# Patient Record
Sex: FEMALE | URBAN - METROPOLITAN AREA
[De-identification: names, ages, dates, MRNs, and addresses within clinical notes are randomized per-mention and may not be internally consistent; named-entity substitution may affect disease eponyms.]

---

## 2022-01-18 ENCOUNTER — INPATIENT (INPATIENT)
Facility: HOSPITAL | Age: 82
LOS: 3 days | Discharge: NURSING, REHAB OR LONG-TERM CARE | DRG: 488 | End: 2022-01-22
Attending: ORTHOPAEDIC SURGERY | Admitting: ORTHOPAEDIC SURGERY
Payer: MEDICARE

## 2022-01-18 VITALS
DIASTOLIC BLOOD PRESSURE: 81 MMHG | HEIGHT: 59 IN | HEART RATE: 110 BPM | OXYGEN SATURATION: 94 % | WEIGHT: 138.01 LBS | RESPIRATION RATE: 18 BRPM | TEMPERATURE: 98 F | SYSTOLIC BLOOD PRESSURE: 142 MMHG

## 2022-01-18 LAB
ANION GAP SERPL CALC-SCNC: 6 MMOL/L — LOW (ref 9–16)
APTT BLD: 45.8 SEC — HIGH (ref 27.5–35.5)
BUN SERPL-MCNC: 30 MG/DL — HIGH (ref 7–23)
CALCIUM SERPL-MCNC: 8.9 MG/DL — SIGNIFICANT CHANGE UP (ref 8.5–10.5)
CHLORIDE SERPL-SCNC: 105 MMOL/L — SIGNIFICANT CHANGE UP (ref 96–108)
CO2 SERPL-SCNC: 29 MMOL/L — SIGNIFICANT CHANGE UP (ref 22–31)
CREAT SERPL-MCNC: 0.66 MG/DL — SIGNIFICANT CHANGE UP (ref 0.5–1.3)
GLUCOSE SERPL-MCNC: 112 MG/DL — HIGH (ref 70–99)
HCT VFR BLD CALC: 38.8 % — SIGNIFICANT CHANGE UP (ref 34.5–45)
HGB BLD-MCNC: 12.8 G/DL — SIGNIFICANT CHANGE UP (ref 11.5–15.5)
INR BLD: 2.5 — HIGH (ref 0.88–1.16)
MCHC RBC-ENTMCNC: 30.7 PG — SIGNIFICANT CHANGE UP (ref 27–34)
MCHC RBC-ENTMCNC: 33 GM/DL — SIGNIFICANT CHANGE UP (ref 32–36)
MCV RBC AUTO: 93 FL — SIGNIFICANT CHANGE UP (ref 80–100)
NRBC # BLD: 0 /100 WBCS — SIGNIFICANT CHANGE UP (ref 0–0)
PLATELET # BLD AUTO: 243 K/UL — SIGNIFICANT CHANGE UP (ref 150–400)
POTASSIUM SERPL-MCNC: 4.2 MMOL/L — SIGNIFICANT CHANGE UP (ref 3.5–5.3)
POTASSIUM SERPL-SCNC: 4.2 MMOL/L — SIGNIFICANT CHANGE UP (ref 3.5–5.3)
PROTHROM AB SERPL-ACNC: 28.7 SEC — HIGH (ref 10.6–13.6)
RBC # BLD: 4.17 M/UL — SIGNIFICANT CHANGE UP (ref 3.8–5.2)
RBC # FLD: 14 % — SIGNIFICANT CHANGE UP (ref 10.3–14.5)
SARS-COV-2 RNA SPEC QL NAA+PROBE: SIGNIFICANT CHANGE UP
SODIUM SERPL-SCNC: 140 MMOL/L — SIGNIFICANT CHANGE UP (ref 132–145)
WBC # BLD: 7.54 K/UL — SIGNIFICANT CHANGE UP (ref 3.8–10.5)
WBC # FLD AUTO: 7.54 K/UL — SIGNIFICANT CHANGE UP (ref 3.8–10.5)

## 2022-01-18 PROCEDURE — 71045 X-RAY EXAM CHEST 1 VIEW: CPT | Mod: 26

## 2022-01-18 PROCEDURE — 73564 X-RAY EXAM KNEE 4 OR MORE: CPT | Mod: 26,RT

## 2022-01-18 PROCEDURE — 99223 1ST HOSP IP/OBS HIGH 75: CPT

## 2022-01-18 PROCEDURE — 99284 EMERGENCY DEPT VISIT MOD MDM: CPT | Mod: FS,25

## 2022-01-18 PROCEDURE — 73130 X-RAY EXAM OF HAND: CPT | Mod: 26,RT

## 2022-01-18 PROCEDURE — 70450 CT HEAD/BRAIN W/O DYE: CPT | Mod: 26,MH

## 2022-01-18 PROCEDURE — 93010 ELECTROCARDIOGRAM REPORT: CPT

## 2022-01-18 RX ORDER — ACETAMINOPHEN 500 MG
650 TABLET ORAL ONCE
Refills: 0 | Status: COMPLETED | OUTPATIENT
Start: 2022-01-18 | End: 2022-01-18

## 2022-01-18 RX ADMIN — Medication 650 MILLIGRAM(S): at 15:28

## 2022-01-18 NOTE — ED BEHAVIORAL HEALTH NOTE - BEHAVIORAL HEALTH NOTE
SW was consulted to the ED by Provider to complete psychosocial assessment for PT admission.  PT gave verbal consent to have assessment.  The assessment was completed by SW.  Team made aware and SW made available for further assistance.

## 2022-01-18 NOTE — ED PROVIDER NOTE - ATTENDING CONTRIBUTION TO CARE
80 yo female with mechanical trip and fall over uneven sidewalk, with (+) patellar fx, requested dr vázquez for orthopedic consults, admit to ortho dr vázquez for ORIF.

## 2022-01-18 NOTE — ED PROVIDER NOTE - CLINICAL SUMMARY MEDICAL DECISION MAKING FREE TEXT BOX
pt presents with right knee pain with patellar irregularity and tenderness on exam. unable to extend the knee. xray shows patellar fracture. pt has existing relationship with Dr. Waite and is requesting his service. will obtain pre-op work up and admit.

## 2022-01-18 NOTE — ED PROVIDER NOTE - OBJECTIVE STATEMENT
82yo F with h/o clotting disorder on coumadin s/p clots to bilat eyes receiving steroid injections, htn, hld presents with right knee pain after mechanical trip and fall. pt states she tripped on uneven sidewalk and landed on her knee. denies hitting her head, ha, dizziness, vision changes, nausea, vomiting, cp, numbness, tingling, weakness, any other concerns.

## 2022-01-18 NOTE — ED ADULT TRIAGE NOTE - CHIEF COMPLAINT QUOTE
Pt BIBA c/o R knee pain s/p mechanical fall. Pt did not hit head, no LOC. Pt has small abrasion on R knee, last tetanus unknown.

## 2022-01-18 NOTE — ED PROVIDER NOTE - MUSCULOSKELETAL, MLM
Spine appears normal, range of motion of spine is not limited, + tenderness and irregularity to the right patella, unable to extend the knee against gravity at all, no tenderness over the patellar tendon or medial/lateral joint line or tibial plateau, no ligamentous laxity

## 2022-01-19 DIAGNOSIS — Z98.890 OTHER SPECIFIED POSTPROCEDURAL STATES: Chronic | ICD-10-CM

## 2022-01-19 DIAGNOSIS — Z98.891 HISTORY OF UTERINE SCAR FROM PREVIOUS SURGERY: Chronic | ICD-10-CM

## 2022-01-19 DIAGNOSIS — M20.41 OTHER HAMMER TOE(S) (ACQUIRED), RIGHT FOOT: Chronic | ICD-10-CM

## 2022-01-19 LAB
ANION GAP SERPL CALC-SCNC: 11 MMOL/L — SIGNIFICANT CHANGE UP (ref 5–17)
APPEARANCE UR: CLEAR — SIGNIFICANT CHANGE UP
APTT BLD: 45.7 SEC — HIGH (ref 27.5–35.5)
BACTERIA # UR AUTO: PRESENT /HPF
BILIRUB UR-MCNC: NEGATIVE — SIGNIFICANT CHANGE UP
BLD GP AB SCN SERPL QL: NEGATIVE — SIGNIFICANT CHANGE UP
BLD GP AB SCN SERPL QL: NEGATIVE — SIGNIFICANT CHANGE UP
BUN SERPL-MCNC: 20 MG/DL — SIGNIFICANT CHANGE UP (ref 7–23)
CALCIUM SERPL-MCNC: 8.9 MG/DL — SIGNIFICANT CHANGE UP (ref 8.4–10.5)
CHLORIDE SERPL-SCNC: 104 MMOL/L — SIGNIFICANT CHANGE UP (ref 96–108)
CO2 SERPL-SCNC: 24 MMOL/L — SIGNIFICANT CHANGE UP (ref 22–31)
COLOR SPEC: YELLOW — SIGNIFICANT CHANGE UP
CREAT SERPL-MCNC: 0.56 MG/DL — SIGNIFICANT CHANGE UP (ref 0.5–1.3)
DIFF PNL FLD: ABNORMAL
EPI CELLS # UR: SIGNIFICANT CHANGE UP /HPF (ref 0–5)
GLUCOSE SERPL-MCNC: 111 MG/DL — HIGH (ref 70–99)
GLUCOSE UR QL: NEGATIVE — SIGNIFICANT CHANGE UP
HCT VFR BLD CALC: 37.1 % — SIGNIFICANT CHANGE UP (ref 34.5–45)
HGB BLD-MCNC: 12.1 G/DL — SIGNIFICANT CHANGE UP (ref 11.5–15.5)
INR BLD: 1.83 — HIGH (ref 0.88–1.16)
INR BLD: 2.03 — HIGH (ref 0.88–1.16)
KETONES UR-MCNC: ABNORMAL MG/DL
LEUKOCYTE ESTERASE UR-ACNC: NEGATIVE — SIGNIFICANT CHANGE UP
MCHC RBC-ENTMCNC: 30 PG — SIGNIFICANT CHANGE UP (ref 27–34)
MCHC RBC-ENTMCNC: 32.6 GM/DL — SIGNIFICANT CHANGE UP (ref 32–36)
MCV RBC AUTO: 91.8 FL — SIGNIFICANT CHANGE UP (ref 80–100)
NITRITE UR-MCNC: NEGATIVE — SIGNIFICANT CHANGE UP
NRBC # BLD: 0 /100 WBCS — SIGNIFICANT CHANGE UP (ref 0–0)
PH UR: 5.5 — SIGNIFICANT CHANGE UP (ref 5–8)
PLATELET # BLD AUTO: 255 K/UL — SIGNIFICANT CHANGE UP (ref 150–400)
POTASSIUM SERPL-MCNC: 4.1 MMOL/L — SIGNIFICANT CHANGE UP (ref 3.5–5.3)
POTASSIUM SERPL-SCNC: 4.1 MMOL/L — SIGNIFICANT CHANGE UP (ref 3.5–5.3)
PROT UR-MCNC: NEGATIVE MG/DL — SIGNIFICANT CHANGE UP
PROTHROM AB SERPL-ACNC: 21.3 SEC — HIGH (ref 10.6–13.6)
PROTHROM AB SERPL-ACNC: 23.5 SEC — HIGH (ref 10.6–13.6)
RBC # BLD: 4.04 M/UL — SIGNIFICANT CHANGE UP (ref 3.8–5.2)
RBC # FLD: 13.7 % — SIGNIFICANT CHANGE UP (ref 10.3–14.5)
RBC CASTS # UR COMP ASSIST: < 5 /HPF — SIGNIFICANT CHANGE UP
RH IG SCN BLD-IMP: POSITIVE — SIGNIFICANT CHANGE UP
RH IG SCN BLD-IMP: POSITIVE — SIGNIFICANT CHANGE UP
SODIUM SERPL-SCNC: 139 MMOL/L — SIGNIFICANT CHANGE UP (ref 135–145)
SP GR SPEC: 1.02 — SIGNIFICANT CHANGE UP (ref 1–1.03)
UROBILINOGEN FLD QL: 0.2 E.U./DL — SIGNIFICANT CHANGE UP
WBC # BLD: 7.7 K/UL — SIGNIFICANT CHANGE UP (ref 3.8–10.5)
WBC # FLD AUTO: 7.7 K/UL — SIGNIFICANT CHANGE UP (ref 3.8–10.5)
WBC UR QL: < 5 /HPF — SIGNIFICANT CHANGE UP

## 2022-01-19 PROCEDURE — 71045 X-RAY EXAM CHEST 1 VIEW: CPT | Mod: 26

## 2022-01-19 RX ORDER — ROSUVASTATIN CALCIUM 5 MG/1
1 TABLET ORAL
Qty: 0 | Refills: 0 | DISCHARGE

## 2022-01-19 RX ORDER — OXYCODONE HYDROCHLORIDE 5 MG/1
10 TABLET ORAL EVERY 4 HOURS
Refills: 0 | Status: DISCONTINUED | OUTPATIENT
Start: 2022-01-19 | End: 2022-01-20

## 2022-01-19 RX ORDER — POVIDONE-IODINE 5 %
1 AEROSOL (ML) TOPICAL ONCE
Refills: 0 | Status: DISCONTINUED | OUTPATIENT
Start: 2022-01-19 | End: 2022-01-20

## 2022-01-19 RX ORDER — CHLORHEXIDINE GLUCONATE 213 G/1000ML
1 SOLUTION TOPICAL EVERY 12 HOURS
Refills: 0 | Status: COMPLETED | OUTPATIENT
Start: 2022-01-19 | End: 2022-01-19

## 2022-01-19 RX ORDER — OXYCODONE HYDROCHLORIDE 5 MG/1
5 TABLET ORAL EVERY 4 HOURS
Refills: 0 | Status: DISCONTINUED | OUTPATIENT
Start: 2022-01-19 | End: 2022-01-20

## 2022-01-19 RX ORDER — ACETAMINOPHEN 500 MG
975 TABLET ORAL EVERY 8 HOURS
Refills: 0 | Status: DISCONTINUED | OUTPATIENT
Start: 2022-01-19 | End: 2022-01-19

## 2022-01-19 RX ORDER — HYDROMORPHONE HYDROCHLORIDE 2 MG/ML
0.5 INJECTION INTRAMUSCULAR; INTRAVENOUS; SUBCUTANEOUS
Refills: 0 | Status: DISCONTINUED | OUTPATIENT
Start: 2022-01-19 | End: 2022-01-19

## 2022-01-19 RX ORDER — SODIUM CHLORIDE 9 MG/ML
1000 INJECTION, SOLUTION INTRAVENOUS
Refills: 0 | Status: DISCONTINUED | OUTPATIENT
Start: 2022-01-19 | End: 2022-01-20

## 2022-01-19 RX ORDER — OXYCODONE HYDROCHLORIDE 5 MG/1
10 TABLET ORAL
Refills: 0 | Status: DISCONTINUED | OUTPATIENT
Start: 2022-01-19 | End: 2022-01-19

## 2022-01-19 RX ORDER — ACETAMINOPHEN 500 MG
650 TABLET ORAL EVERY 6 HOURS
Refills: 0 | Status: COMPLETED | OUTPATIENT
Start: 2022-01-19 | End: 2022-01-19

## 2022-01-19 RX ORDER — OXYCODONE HYDROCHLORIDE 5 MG/1
5 TABLET ORAL
Refills: 0 | Status: DISCONTINUED | OUTPATIENT
Start: 2022-01-19 | End: 2022-01-19

## 2022-01-19 RX ORDER — ATORVASTATIN CALCIUM 80 MG/1
20 TABLET, FILM COATED ORAL AT BEDTIME
Refills: 0 | Status: DISCONTINUED | OUTPATIENT
Start: 2022-01-19 | End: 2022-01-22

## 2022-01-19 RX ORDER — ONDANSETRON 8 MG/1
4 TABLET, FILM COATED ORAL EVERY 6 HOURS
Refills: 0 | Status: DISCONTINUED | OUTPATIENT
Start: 2022-01-19 | End: 2022-01-19

## 2022-01-19 RX ORDER — ESCITALOPRAM OXALATE 10 MG/1
1 TABLET, FILM COATED ORAL
Qty: 0 | Refills: 0 | DISCHARGE

## 2022-01-19 RX ORDER — ESCITALOPRAM OXALATE 10 MG/1
10 TABLET, FILM COATED ORAL DAILY
Refills: 0 | Status: DISCONTINUED | OUTPATIENT
Start: 2022-01-19 | End: 2022-01-22

## 2022-01-19 RX ADMIN — Medication 650 MILLIGRAM(S): at 06:55

## 2022-01-19 RX ADMIN — Medication 650 MILLIGRAM(S): at 12:41

## 2022-01-19 RX ADMIN — Medication 650 MILLIGRAM(S): at 05:55

## 2022-01-19 RX ADMIN — Medication 650 MILLIGRAM(S): at 11:26

## 2022-01-19 RX ADMIN — Medication 650 MILLIGRAM(S): at 19:32

## 2022-01-19 RX ADMIN — Medication 650 MILLIGRAM(S): at 18:06

## 2022-01-19 RX ADMIN — CHLORHEXIDINE GLUCONATE 1 APPLICATION(S): 213 SOLUTION TOPICAL at 05:55

## 2022-01-19 RX ADMIN — SODIUM CHLORIDE 80 MILLILITER(S): 9 INJECTION, SOLUTION INTRAVENOUS at 05:56

## 2022-01-19 RX ADMIN — CHLORHEXIDINE GLUCONATE 1 APPLICATION(S): 213 SOLUTION TOPICAL at 19:32

## 2022-01-19 NOTE — CONSULT NOTE ADULT - ATTENDING COMMENTS
Pt. seen and examined by me earlier today; I have read Dr. Adam's consult note, I agree w/ her impression and recommendations as above; EKG reviewed; from a cardiac standpoint, Pt. is a low-risk Pt. for an intermediate-risk procedure; she can proceed to OR without need for further cardiac testing; however, suggest Heme-Onc consult for help managing perioperative anticoagulation in setting of hypercoagulable disorder; will need collateral info from outpatient hematologist as well; holding Coumadin for now, monitor coags Pt. seen and examined by me earlier today; I have read Dr. Adam's consult note, I agree w/ her impression and recommendations as above; EKG reviewed; from a cardiac standpoint, Pt. is a low-risk Pt. for an intermediate-risk procedure; she can proceed to OR without need for further cardiac testing; however, suggest Heme-Onc consult for help managing perioperative anticoagulation in setting of hypercoagulable disorder; will need collateral info from outpatient hematologist as well; holding Coumadin for now, monitor coags; d/w Ortho NP

## 2022-01-19 NOTE — PATIENT PROFILE ADULT - NSTRANSFERBELONGINGSRESP_GEN_A_NUR
Have Your Skin Lesions Been Treated?: not been treated Is This A New Presentation, Or A Follow-Up?: Skin Lesions yes

## 2022-01-19 NOTE — CONSULT NOTE ADULT - ASSESSMENT
81 F PMH breast ca (in remission), unknown clotting disorder (on coumadin, last dose 1/17) HLD who was transferred from [facility] w/ R patella fx   Breast cancer for diagnosed in 1999, stage 1 s/p lumpectomy and RT. Developed first "clot" on tamoxifen in 2000.     #Hypercoagulable disorder  - Follows oncologist Dr. Albrecht (will call for collateral)  - 2 episodes of "clots in her eyes" (likely CRVO x2, once in 2000 and then in 2014)   - On coumadin  - Can give PO vit K to target inr 1.5 for surgery  - Bridge with heparin or Lovenox post op back to coumadin  (Lovenox dose is 1 mg/kg BID, adjust for renal function)     D/w Dr. Kelley   81 F PMH breast ca (in remission), unknown clotting disorder (on coumadin, last dose 1/17) HLD who was transferred from [facility] w/ R patella fx   Breast cancer for diagnosed in 1999, stage 1 s/p lumpectomy and RT. Developed first "clot" on tamoxifen in 2000.     #Hypercoagulable disorder  - Follows oncologist Dr. Albrecht (will call for collateral)  - 2 episodes of "clots in her eyes" (likely CRVO x2, once in 2000 and then in 2014)   - On coumadin  - Can give PO vit K to target inr 1.5 for surgery  - Bridge with heparin or Lovenox post op back to coumadin  (Lovenox dose is 1 mg/kg BID, adjust for renal function)     D/w Dr. Kelley    Update; Was called by Nurse from [place] [facility], informed patient's underlying hypercoagulable state was prothrombin gene mutations (Not documented if heterozygous/homozygous)

## 2022-01-19 NOTE — CONSULT NOTE ADULT - SUBJECTIVE AND OBJECTIVE BOX
Hematology Consult Note    HPI:  81 F PMH breast ca (in remission), unknown clotting disorder (on coumadin, last dose ) HLD who was transferred from [facility] w/ [facility]. Pt endorsed immediate pain and swelling of R knee after injury, and states that she was unable to bear weight on RLE after injury. She was subsequently brought to [facility]. Denies nu,bness/tingling. Endorses limited ROM of R knee 2/2 pain but denies new motor deficits. Denies head strike. Denies LOC. States that her fingers feel sore but states that she has full ROM of b/l fingers and wrist.  (2022 02:38)    Allergies  Compazine (Anaphylaxis)  Intolerances        MEDICATIONS  (STANDING):  acetaminophen     Tablet .. 650 milliGRAM(s) Oral every 6 hours  atorvastatin 20 milliGRAM(s) Oral at bedtime  chlorhexidine 2% Cloths 1 Application(s) Topical every 12 hours  escitalopram 10 milliGRAM(s) Oral daily  lactated ringers. 1000 milliLiter(s) (80 mL/Hr) IV Continuous <Continuous>  povidone iodine 5% Nasal Swab 1 Application(s) Both Nostrils once    MEDICATIONS  (PRN):  oxyCODONE    IR 10 milliGRAM(s) Oral every 4 hours PRN Moderate Pain (4 - 6)  oxyCODONE    IR 5 milliGRAM(s) Oral every 4 hours PRN Mild Pain (1 - 3)      PAST MEDICAL & SURGICAL HISTORY:  Mild HTN  HLD (hyperlipidemia)  Clotting disorder  History of lumpectomy of right breast  H/O  section  History of bunionectomy of both great toes  Hammer toe of right foot  H/O repair of right rotator cuff        FAMILY HISTORY:      SOCIAL HISTORY: No EtOH, no tobacco    REVIEW OF SYSTEMS:        Height (cm): 151.1 ( @ 01:00)  Weight (kg): 62.6 ( @ 14:30)  BMI (kg/m2): 27.4 ( @ 01:00)  BSA (m2): 1.58 ( @ 01:00)    T(F): 98.2 (22 @ 08:44), Max: 98.8 (22 @ 05:25)  HR: 81 (22 @ 08:44)  BP: 126/77 (22 @ 08:44)  RR: 16 (22 @ 08:44)  SpO2: 93% (22 @ 08:44)  Wt(kg): --    GENERAL: NAD  HEAD:  Atraumatic, Normocephalic  EYES: EOMI  NECK: Supple  CHEST/LUNG: nonlabored  HEART: S1S2  ABDOMEN: Soft, Nontender  EXTREMITIES: , No edema  NEUROLOGY: non-focal  SKIN: No rashes or lesions                          12.1   7.70  )-----------( 255      ( 2022 05:30 )             37.1           139  |  104  |  20  ----------------------------<  111<H>  4.1   |  24  |  0.56    Ca    8.9      2022 05:30

## 2022-01-19 NOTE — DISCHARGE NOTE PROVIDER - CARE PROVIDER_API CALL
Gerber Waite)  Orthopaedic Surgery  [address] [place] [address], [address]  [place] [address]  Phone: [phone]  Fax: [phone]  Follow Up Time: 2 weeks

## 2022-01-19 NOTE — DISCHARGE NOTE PROVIDER - NSDCMRMEDTOKEN_GEN_ALL_CORE_FT
Coumadin 4 mg oral tablet: 1 tab(s) orally once a day  Crestor 20 mg oral tablet: 1 tab(s) orally once a day  escitalopram 10 mg oral tablet: 1 tab(s) orally once a day   acetaminophen 325 mg oral tablet: 3 tab(s) orally every 8 hours  Coumadin 4 mg oral tablet: 1 tab(s) orally once a day on Mon, Tue, Wed, Fri, Sat  Coumadin 5 mg oral tablet: 1 tab(s) orally once a day on Thursdays  Crestor 20 mg oral tablet: 1 tab(s) orally once a day  escitalopram 10 mg oral tablet: 1 tab(s) orally once a day   acetaminophen 325 mg oral tablet: 3 tab(s) orally every 8 hours  bisacodyl 10 mg rectal suppository: 1 suppository(ies) rectal once a day, As needed, Constipation  Coumadin 4 mg oral tablet: 1 tab(s) orally once a day on Mon, Tue, Wed, Fri, Sat  Coumadin 5 mg oral tablet: 1 tab(s) orally once a day on Thursdays  Crestor 20 mg oral tablet: 1 tab(s) orally once a day  enoxaparin 60 mg/0.6 mL injectable solution:  injectable every 12 hours.    INR SHOULD BE CHECKED DAILY. DISCONTINUE LOVENOX WHEN INR &gt;2.0  escitalopram 10 mg oral tablet: 1 tab(s) orally once a day  oxyCODONE 10 mg oral tablet: 1 tab(s) orally every 4 hours, As needed, Severe Pain (7 - 10)  oxyCODONE 5 mg oral tablet: 1 tab(s) orally every 4 hours, As needed, Moderate Pain (4 - 6)  polyethylene glycol 3350 oral powder for reconstitution: 17 gram(s) orally once a day  senna oral tablet: 2 tab(s) orally once a day (at bedtime)

## 2022-01-19 NOTE — CONSULT NOTE ADULT - ASSESSMENT
#Pre-op clearance  - RENA  - Evelina  - DASI  - STOP BANG  - EKG NSR    #Clotting disorder (unknown) 80 yo F with PMHx breast cancer (s/p lumpectomy and RT in 1999), HTN, familial hypercholesterolemia, depression, unknown clotting disorder presents s/p fall and resulting R patellar fx pending OR with ortho.  Internal Medicine consulted for pre-op clearance.    #Pre-op clearance  - RCRI: Class 1 risk with 3.9% 30 day risk of death, MI, or cardiac arrest  - Hoyt: 0.2% risk of MI or cardiac arrest intraoperatively or up to 30 days post op  - DASI: 5.29 METS  - STOP BANG: low risk for TRINA  - EKG NSR    #Clotting disorder (unknown)  On warfarin 4mg qd except 5mg on Thursdays.  Has not taken medication since Monday evening.  - restart post-op 80 yo F with PMHx breast cancer (s/p lumpectomy and RT in 1999), HTN, familial hypercholesterolemia, depression, unknown clotting disorder presents s/p fall and resulting R patellar fx pending OR with ortho.  Internal Medicine consulted for pre-op clearance.    #Pre-op clearance  Patient is low risk for moderate risk surgery  - METS >4  - RCRI: Class 1 risk with 3.9% 30 day risk of death, MI, or cardiac arrest  - Hoyt: 0.2% risk of MI or cardiac arrest intraoperatively or up to 30 days post op  - DASI: 5.29 METS  - STOP BANG: low risk for TRINA  - EKG sinus tach  - CXR w/o infiltrates    #Clotting disorder (unknown)  On warfarin 4mg qd except 5mg on Thursdays.  Has not taken medication since Monday evening.  INR upon admission 2.50.  - can consider vitamin K if would like full coumadin reversal prior to OR  - restart post-op

## 2022-01-19 NOTE — H&P ADULT - NSHPLABSRESULTS_GEN_ALL_CORE
LABS/RADIOLOGY RESULTS:                          12.8   7.54  )-----------( 243      ( 2022 20:26 )             38.8   -18    140  |  105  |  30<H>  ----------------------------<  112<H>  4.2   |  29  |  0.66    Ca    8.9      2022 20:26    PT/INR - ( 2022 20:26 )   PT: 28.7 sec;   INR: 2.50          PTT - ( 2022 20:26 )  PTT:45.8 secBlood Cultures    Urinalysis Basic - ( 2022 01:18 )    Color: Yellow / Appearance: Clear / S.025 / pH:   Gluc:  / Ketone: Trace mg/dL  / Bili: Negative / Urobili: 0.2 E.U./dL   Blood:  / Protein: NEGATIVE mg/dL / Nitrite: NEGATIVE   Leuk Esterase: NEGATIVE / RBC: < 5 /HPF / WBC < 5 /HPF   Sq Epi:  / Non Sq Epi: 0-5 /HPF / Bacteria: Present /HPF    XR:   XR R knee AP and lateral demonstrate transverse fx of R patella with ~6 mm displacement and comminution of superior aspect of superior pole of patella     XR R Hand demonstrate no acute osseous abnormality LABS/RADIOLOGY RESULTS:                          12.8   7.54  )-----------( 243      ( 2022 20:26 )             38.8   -18    140  |  105  |  30<H>  ----------------------------<  112<H>  4.2   |  29  |  0.66    Ca    8.9      2022 20:26    PT/INR - ( 2022 20:26 )   PT: 28.7 sec;   INR: 2.50          PTT - ( 2022 20:26 )  PTT:45.8 secBlood Cultures    Urinalysis Basic - ( 2022 01:18 )    Color: Yellow / Appearance: Clear / S.025 / pH:   Gluc:  / Ketone: Trace mg/dL  / Bili: Negative / Urobili: 0.2 E.U./dL   Blood:  / Protein: NEGATIVE mg/dL / Nitrite: NEGATIVE   Leuk Esterase: NEGATIVE / RBC: < 5 /HPF / WBC < 5 /HPF   Sq Epi:  / Non Sq Epi: 0-5 /HPF / Bacteria: Present /HPF    XR:   XR R knee AP and lateral demonstrate transverse fx of R patella with ~6 mm displacement     XR R Hand demonstrate no acute osseous abnormality

## 2022-01-19 NOTE — H&P ADULT - ASSESSMENT
81 F PMH breast ca (in remission), unknown clotting disorder (on coumadin, last dose 1/17) HLD who was [facility].    Plan  - admit to ortho, regional, under dr waite  - plan for ORIF patella, possible patellectomy 1/19, added on to OR schedule  - pre op labs  - NPO @ [facility]  - f/u EKG   - f/u UA   - f/u med consult re medical clearance for OR   - placed in KI in extension, WBAT  - discussed w/ Dr Waite      81 F PMH breast ca (in remission), unknown clotting disorder (on coumadin, last dose 1/17) HLD who was [facility] from [facility]/ [facility].    Plan  - admit to ortho, regional, under dr waite  - plan for ORIF patella, added on to OR schedule  - pre op labs  - NPO @ [facility]  - f/u EKG   - f/u UA   - f/u med consult re medical clearance for OR   - placed in KI in extension, WBAT  - discussed w/ Dr Waite

## 2022-01-19 NOTE — PATIENT PROFILE ADULT - VISION (WITH CORRECTIVE LENSES IF THE PATIENT USUALLY WEARS THEM):
Patient can't see well without reading glasses/Partially impaired: cannot see medication labels or newsprint, but can see obstacles in path, and the surrounding layout; can count fingers at arm's length

## 2022-01-19 NOTE — PATIENT PROFILE ADULT - SAFE PLACE TO LIVE
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

## 2022-01-19 NOTE — H&P ADULT - NSHPPHYSICALEXAM_GEN_ALL_CORE
Constitutional: vitals reviewed per nursing documentation, NAD, lying comfortably in bed/stretcher  Eyes: PERRLA,   Neck: trachea midline  Lungs: not using accessory muscles of respiration, normal respiratory effort  Neuro/Psych: A&Ox3, normal affect and mood  MSK:   UE: b/l UE are non TTP, able to PF/DF wrist b/l with minimal pain and with FROM, AIN/PIN/ulnar firing b/l   RLE - RLE resting in extension, unable to flex knee greater than 10 degrees (active and passive) 2/2 pain, 5/5 EHL/FHL/TA/GS, unable to test quad/hamstring 2/2 severe pain, unable to SLR, sensation intact to light touch, , 2+ DP pulse, + superficial abrasion and erythema on anterior knee, patella TTP

## 2022-01-19 NOTE — CONSULT NOTE ADULT - SUBJECTIVE AND OBJECTIVE BOX
INCOMPLETE NOTE    INTERNAL MEDICINE SERVICE INITIAL CONSULT NOTE    HPI:  81 F PMH breast ca (in remission), unknown clotting disorder (on coumadin, last dose ) HLD who was transferred from [facility] w/ R patella fx after sustaining [facility] fall on R knee and R hand this afternoon. Pt endorsed immediate pain and swelling of R knee after injury, and states that she was unable to bear weight on RLE after injury. She was subsequently brought to [facility]. Denies numbness/tingling. Endorses limited ROM of R knee 2/2 pain but denies new motor deficits. Denies head strike. Denies LOC. States that her fingers feel sore but states that she has full ROM of b/l fingers and wrist.  (2022 02:38)      ADDITIONAL MEDICINE HPI:    REVIEW OF SYSTEMS:   Otherwise negative except as specified in HPI    PAST MEDICAL HISTORY:     PAST SURGICAL HISTORY:    FAMILY HISTORY:    SOCIAL HISTORY:  Tobacco use:  EtOH use:  Illicit drug use:    MEDICATIONS:  MEDICATIONS  (STANDING):  acetaminophen     Tablet .. 650 milliGRAM(s) Oral every 6 hours  atorvastatin 20 milliGRAM(s) Oral at bedtime  chlorhexidine 2% Cloths 1 Application(s) Topical every 12 hours  escitalopram 10 milliGRAM(s) Oral daily  lactated ringers. 1000 milliLiter(s) (80 mL/Hr) IV Continuous <Continuous>  povidone iodine 5% Nasal Swab 1 Application(s) Both Nostrils once    MEDICATIONS  (PRN):  oxyCODONE    IR 10 milliGRAM(s) Oral every 4 hours PRN Moderate Pain (4 - 6)  oxyCODONE    IR 5 milliGRAM(s) Oral every 4 hours PRN Mild Pain (1 - 3)      ALLERGIES:  Allergies    Compazine (Anaphylaxis)    Intolerances        VITAL SIGNS:  Vital Signs Last 24 Hrs  T(C): 36.8 (2022 01:14), Max: 36.9 (2022 21:21)  T(F): 98.2 (2022 01:14), Max: 98.5 (2022 21:21)  HR: 103 (2022 01:14) (96 - 110)  BP: 153/92 (2022 01:14) (121/84 - 160/74)  BP(mean): --  RR: 18 (2022 01:14) (16 - 18)  SpO2: 95% (2022 01:14) (94% - 96%)    22 @ 07:01  -  22 @ 05:27  --------------------------------------------------------  IN:  Total IN: 0 mL    OUT:    Voided (mL): 100 mL  Total OUT: 100 mL    Total NET: -100 mL          PHYSICAL EXAM:  Constitutional: WDWN resting comfortably in bed; NAD  Head: NC/AT  Eyes: PERRL, EOMI, anicteric sclera  ENT: no nasal discharge; uvula midline, no oropharyngeal erythema or exudates; MMM  Neck: supple; no JVD or thyromegaly  Respiratory: CTA B/L; no W/R/R, no retractions  Cardiac: +S1/S2; RRR; no M/R/G; PMI non-displaced  Gastrointestinal: abdomen soft, NT/ND; no rebound or guarding; +BSx4  Genitourinary: normal external genitalia  Back: spine midline, no bony tenderness or step-offs; no CVAT B/L  Extremities: WWP, no clubbing or cyanosis; no peripheral edema  Musculoskeletal: NROM x4; no joint swelling, tenderness or erythema  Vascular: 2+ radial, femoral, DP/PT pulses B/L  Dermatologic: skin warm, dry and intact; no rashes, wounds, or scars  Lymphatic: no submandibular or cervical LAD  Neurologic: AAOx3; CNII-XII grossly intact; no focal deficits  Psychiatric: affect and characteristics of appearance, verbalizations, behaviors are appropriate    LABS:                        12.8   7.54  )-----------( 243      ( 2022 20:26 )             38.8         140  |  105  |  30<H>  ----------------------------<  112<H>  4.2   |  29  |  0.66    Ca    8.9      2022 20:26      PT/INR - ( 2022 20:26 )   PT: 28.7 sec;   INR: 2.50          PTT - ( 2022 20:26 )  PTT:45.8 sec  Urinalysis Basic - ( 2022 01:18 )    Color: Yellow / Appearance: Clear / S.025 / pH: x  Gluc: x / Ketone: Trace mg/dL  / Bili: Negative / Urobili: 0.2 E.U./dL   Blood: x / Protein: NEGATIVE mg/dL / Nitrite: NEGATIVE   Leuk Esterase: NEGATIVE / RBC: < 5 /HPF / WBC < 5 /HPF   Sq Epi: x / Non Sq Epi: 0-5 /HPF / Bacteria: Present /HPF          CAPILLARY BLOOD GLUCOSE              RADIOLOGY & ADDITIONAL TESTS: Reviewed. INTERNAL MEDICINE SERVICE INITIAL CONSULT NOTE    HPI:  81 F PMH breast ca (in remission), unknown clotting disorder (on coumadin, last dose ) HLD who was transferred from [facility] w/ R patella fx after [facility] and R hand this afternoon. Pt endorsed immediate pain and swelling of R knee after injury, and states that she was unable to bear weight on RLE after injury. She was subsequently brought to [facility]. Denies numbness/tingling. Endorses limited ROM of R knee 2/2 pain but denies new motor deficits. Denies head strike. Denies LOC. States that her fingers feel sore but states that she has full ROM of b/l fingers and wrist.  (2022 02:38)      ADDITIONAL MEDICINE HPI:     REVIEW OF SYSTEMS:   Otherwise negative except as specified in HPI    PAST MEDICAL HISTORY:     PAST SURGICAL HISTORY:    FAMILY HISTORY:    SOCIAL HISTORY:  Tobacco use:  EtOH use:  Illicit drug use:    MEDICATIONS:  MEDICATIONS  (STANDING):  acetaminophen     Tablet .. 650 milliGRAM(s) Oral every 6 hours  atorvastatin 20 milliGRAM(s) Oral at bedtime  chlorhexidine 2% Cloths 1 Application(s) Topical every 12 hours  escitalopram 10 milliGRAM(s) Oral daily  lactated ringers. 1000 milliLiter(s) (80 mL/Hr) IV Continuous <Continuous>  povidone iodine 5% Nasal Swab 1 Application(s) Both Nostrils once    MEDICATIONS  (PRN):  oxyCODONE    IR 10 milliGRAM(s) Oral every 4 hours PRN Moderate Pain (4 - 6)  oxyCODONE    IR 5 milliGRAM(s) Oral every 4 hours PRN Mild Pain (1 - 3)      ALLERGIES:  Allergies    Compazine (Anaphylaxis)    Intolerances        VITAL SIGNS:  Vital Signs Last 24 Hrs  T(C): 36.8 (2022 01:14), Max: 36.9 (2022 21:21)  T(F): 98.2 (2022 01:14), Max: 98.5 (2022 21:21)  HR: 103 (2022 01:14) (96 - 110)  BP: 153/92 (2022 01:14) (121/84 - 160/74)  BP(mean): --  RR: 18 (2022 01:14) (16 - 18)  SpO2: 95% (2022 01:14) (94% - 96%)    22 @ 07:01  -  22 @ 05:27  --------------------------------------------------------  IN:  Total IN: 0 mL    OUT:    Voided (mL): 100 mL  Total OUT: 100 mL    Total NET: -100 mL          PHYSICAL EXAM:  Constitutional: WDWN resting comfortably in bed; NAD  Head: NC/AT  Eyes: PERRL, EOMI, anicteric sclera  ENT: no nasal discharge; uvula midline, no oropharyngeal erythema or exudates; MMM  Neck: supple; no JVD or thyromegaly  Respiratory: CTA B/L; no W/R/R, no retractions  Cardiac: +S1/S2; RRR; no M/R/G; PMI non-displaced  Gastrointestinal: abdomen soft, NT/ND; no rebound or guarding; +BSx4  Genitourinary: normal external genitalia  Back: spine midline, no bony tenderness or step-offs; no CVAT B/L  Extremities: WWP, no clubbing or cyanosis; no peripheral edema  Musculoskeletal: NROM x4; no joint swelling, tenderness or erythema  Vascular: 2+ radial, femoral, DP/PT pulses B/L  Dermatologic: skin warm, dry and intact; no rashes, wounds, or scars  Lymphatic: no submandibular or cervical LAD  Neurologic: AAOx3; CNII-XII grossly intact; no focal deficits  Psychiatric: affect and characteristics of appearance, verbalizations, behaviors are appropriate    LABS:                        12.8   7.54  )-----------( 243      ( 2022 20:26 )             38.8         140  |  105  |  30<H>  ----------------------------<  112<H>  4.2   |  29  |  0.66    Ca    8.9      2022 20:26      PT/INR - ( 2022 20:26 )   PT: 28.7 sec;   INR: 2.50          PTT - ( 2022 20:26 )  PTT:45.8 sec  Urinalysis Basic - ( 2022 01:18 )    Color: Yellow / Appearance: Clear / S.025 / pH: x  Gluc: x / Ketone: Trace mg/dL  / Bili: Negative / Urobili: 0.2 E.U./dL   Blood: x / Protein: NEGATIVE mg/dL / Nitrite: NEGATIVE   Leuk Esterase: NEGATIVE / RBC: < 5 /HPF / WBC < 5 /HPF   Sq Epi: x / Non Sq Epi: 0-5 /HPF / Bacteria: Present /HPF          CAPILLARY BLOOD GLUCOSE              RADIOLOGY & ADDITIONAL TESTS: Reviewed. INTERNAL MEDICINE SERVICE INITIAL CONSULT NOTE    HPI:  81 F PMH breast ca (in remission), unknown clotting disorder (on coumadin, last dose ) HLD who was transferred from [facility] w/ R patella fx after sustaining [facility] fall on R knee and R hand this afternoon. Pt endorsed immediate pain and swelling of R knee after injury, and states that she was unable to bear weight on RLE after injury. She was subsequently brought to [facility]. Denies numbness/tingling. Endorses limited ROM of R knee 2/2 pain but denies new motor deficits. Denies head strike. Denies LOC. States that her fingers feel sore but states that she has full ROM of b/l fingers and wrist.  (2022 02:38)      ADDITIONAL MEDICINE HPI:  on warfarin for hx of blood clots 2/2 unknown clotting disorder.  patient has had a bloot clot in each of her eyes.  patient last took warfarin on Monday evening, .    Home meds: escitalopram 10mg qd  Repatha 140mg qd  Ezetimibe 10mg qd  Rosuvastatin 20mg qd  Olmesartan 20mg qd  Warfarin 4mg qd, except 5mg on     REVIEW OF SYSTEMS:   Otherwise negative except as specified in HPI    PAST MEDICAL HISTORY: depression, familial hypercholesterolemia, HTN    PAST SURGICAL HISTORY: breast cancer s/p lumpectomy and RT ()    FAMILY HISTORY: Father  of MI    SOCIAL HISTORY:  Tobacco use: former, 15 pack year hx  EtOH use: 1 glass wine daily  Illicit drug use: denies    MEDICATIONS:  MEDICATIONS  (STANDING):  acetaminophen     Tablet .. 650 milliGRAM(s) Oral every 6 hours  atorvastatin 20 milliGRAM(s) Oral at bedtime  chlorhexidine 2% Cloths 1 Application(s) Topical every 12 hours  escitalopram 10 milliGRAM(s) Oral daily  lactated ringers. 1000 milliLiter(s) (80 mL/Hr) IV Continuous <Continuous>  povidone iodine 5% Nasal Swab 1 Application(s) Both Nostrils once    MEDICATIONS  (PRN):  oxyCODONE    IR 10 milliGRAM(s) Oral every 4 hours PRN Moderate Pain (4 - 6)  oxyCODONE    IR 5 milliGRAM(s) Oral every 4 hours PRN Mild Pain (1 - 3)      ALLERGIES:  Allergies    Compazine (Anaphylaxis)    Intolerances        VITAL SIGNS:  Vital Signs Last 24 Hrs  T(C): 36.8 (2022 01:14), Max: 36.9 (2022 21:21)  T(F): 98.2 (2022 01:14), Max: 98.5 (2022 21:21)  HR: 103 (2022 01:14) (96 - 110)  BP: 153/92 (2022 01:14) (121/84 - 160/74)  BP(mean): --  RR: 18 (2022 01:14) (16 - 18)  SpO2: 95% (2022 01:14) (94% - 96%)    22 @ 07:01  -  - @ 05:27  --------------------------------------------------------  IN:  Total IN: 0 mL    OUT:    Voided (mL): 100 mL  Total OUT: 100 mL    Total NET: -100 mL          PHYSICAL EXAM:  Constitutional: WDWN resting comfortably in bed; NAD  Head: NC/AT  Eyes: PERRL, EOMI, anicteric sclera, decreased vision in all 4 visual fields, R>L  ENT: no nasal discharge; uvula midline, no oropharyngeal erythema or exudates; dry mucous membranes  Neck: supple  Respiratory: CTA B/L; no W/R/R, no retractions  Cardiac: +S1/S2; RRR; no M/R/G  Gastrointestinal: abdomen soft, NT/ND; no rebound or guarding; +BSx4B/L  Extremities: R leg in cast, cool feet  Musculoskeletal: did not assess  Vascular: 2+ radial, DP pulses b/l, unable to palpate DP  Dermatologic: skin warm, dry and intact; no rashes, wounds, or scars  Lymphatic: no submandibular or cervical LAD  Neurologic: AAOx3; CNII-XII grossly intact; no focal deficits  Psychiatric: affect and characteristics of appearance, verbalizations, behaviors are appropriate    LABS:                        12.8   7.54  )-----------( 243      ( 2022 20:26 )             38.8         140  |  105  |  30<H>  ----------------------------<  112<H>  4.2   |  29  |  0.66    Ca    8.9      2022 20:26      PT/INR - ( 2022 20:26 )   PT: 28.7 sec;   INR: 2.50          PTT - ( 2022 20:26 )  PTT:45.8 sec  Urinalysis Basic - ( 2022 01:18 )    Color: Yellow / Appearance: Clear / S.025 / pH: x  Gluc: x / Ketone: Trace mg/dL  / Bili: Negative / Urobili: 0.2 E.U./dL   Blood: x / Protein: NEGATIVE mg/dL / Nitrite: NEGATIVE   Leuk Esterase: NEGATIVE / RBC: < 5 /HPF / WBC < 5 /HPF   Sq Epi: x / Non Sq Epi: 0-5 /HPF / Bacteria: Present /HPF          CAPILLARY BLOOD GLUCOSE              RADIOLOGY & ADDITIONAL TESTS: Reviewed. INTERNAL MEDICINE SERVICE INITIAL CONSULT NOTE    HPI:  81 F PMH breast ca (in remission), unknown clotting disorder (on coumadin, last dose ) HLD who was transferred from [facility] w/ R patella fx after sustaining [facility] fall on R knee and R hand this afternoon. Pt endorsed immediate pain and swelling of R knee after injury, and states that she was unable to bear weight on RLE after injury. She was subsequently brought to [facility]. Denies numbness/tingling. Endorses limited ROM of R knee 2/2 pain but denies new motor deficits. Denies head strike. Denies LOC. States that her fingers feel sore but states that she has full ROM of b/l fingers and wrist.  (2022 02:38)      ADDITIONAL MEDICINE HPI:  on warfarin for hx of blood clots 2/2 unknown clotting disorder.  patient has had a bloot clot in each of her eyes.  patient last took warfarin on Monday evening, .  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    Home meds:   escitalopram 10mg qd  Repatha 140mg qd  Ezetimibe 10mg qd  Rosuvastatin 20mg qd  Olmesartan 20mg qd  Warfarin 4mg qd, except 5mg on     REVIEW OF SYSTEMS:   Otherwise negative except as specified in HPI    PAST MEDICAL HISTORY: depression, familial hypercholesterolemia, HTN    PAST SURGICAL HISTORY: breast cancer s/p lumpectomy and RT ()    FAMILY HISTORY: Father  of MI    SOCIAL HISTORY:  Tobacco use: former, 15 pack year hx  EtOH use: 1 glass wine daily  Illicit drug use: denies    MEDICATIONS:  MEDICATIONS  (STANDING):  acetaminophen     Tablet .. 650 milliGRAM(s) Oral every 6 hours  atorvastatin 20 milliGRAM(s) Oral at bedtime  chlorhexidine 2% Cloths 1 Application(s) Topical every 12 hours  escitalopram 10 milliGRAM(s) Oral daily  lactated ringers. 1000 milliLiter(s) (80 mL/Hr) IV Continuous <Continuous>  povidone iodine 5% Nasal Swab 1 Application(s) Both Nostrils once    MEDICATIONS  (PRN):  oxyCODONE    IR 10 milliGRAM(s) Oral every 4 hours PRN Moderate Pain (4 - 6)  oxyCODONE    IR 5 milliGRAM(s) Oral every 4 hours PRN Mild Pain (1 - 3)      ALLERGIES:  Allergies    Compazine (Anaphylaxis)    Intolerances        VITAL SIGNS:  Vital Signs Last 24 Hrs  T(C): 36.8 (2022 01:14), Max: 36.9 (2022 21:21)  T(F): 98.2 (2022 01:14), Max: 98.5 (2022 21:21)  HR: 103 (2022 01:14) (96 - 110)  BP: 153/92 (2022 01:14) (121/84 - 160/74)  BP(mean): --  RR: 18 (2022 01:14) (16 - 18)  SpO2: 95% (:14) (94% - 96%)    22 @ 07:01  -  22 @ 05:27  --------------------------------------------------------  IN:  Total IN: 0 mL    OUT:    Voided (mL): 100 mL  Total OUT: 100 mL    Total NET: -100 mL          PHYSICAL EXAM:  Constitutional: WDWN resting comfortably in bed; NAD  Head: NC/AT  Eyes: PERRL, EOMI, anicteric sclera, decreased vision in all 4 visual fields, R>L  ENT: no nasal discharge; uvula midline, no oropharyngeal erythema or exudates; dry mucous membranes  Neck: supple  Respiratory: CTA B/L; no W/R/R, no retractions  Cardiac: +S1/S2; RRR; no M/R/G  Gastrointestinal: abdomen soft, NT/ND; no rebound or guarding; +BSx4B/L  Extremities: R leg in cast, cool feet  Musculoskeletal: did not assess  Vascular: 2+ radial, DP pulses b/l, unable to palpate DP  Dermatologic: skin warm, dry and intact; no rashes, wounds, or scars  Lymphatic: no submandibular or cervical LAD  Neurologic: AAOx3; CNII-XII grossly intact; no focal deficits  Psychiatric: affect and characteristics of appearance, verbalizations, behaviors are appropriate    LABS:                        12.8   7.54  )-----------( 243      ( 2022 20:26 )             38.8     -18    140  |  105  |  30<H>  ----------------------------<  112<H>  4.2   |  29  |  0.66    Ca    8.9      2022 20:26      PT/INR - ( 2022 20:26 )   PT: 28.7 sec;   INR: 2.50          PTT - ( 2022 20:26 )  PTT:45.8 sec  Urinalysis Basic - ( 2022 01:18 )    Color: Yellow / Appearance: Clear / S.025 / pH: x  Gluc: x / Ketone: Trace mg/dL  / Bili: Negative / Urobili: 0.2 E.U./dL   Blood: x / Protein: NEGATIVE mg/dL / Nitrite: NEGATIVE   Leuk Esterase: NEGATIVE / RBC: < 5 /HPF / WBC < 5 /HPF   Sq Epi: x / Non Sq Epi: 0-5 /HPF / Bacteria: Present /HPF          CAPILLARY BLOOD GLUCOSE              RADIOLOGY & ADDITIONAL TESTS: Reviewed.

## 2022-01-19 NOTE — DISCHARGE NOTE PROVIDER - NSDCFUADDINST_GEN_ALL_CORE_FT
Weight bear as tolerated with assistive device.  No strenuous activity, heavy lifting, driving or returning to work until cleared by MD.  You may shower - dressing is water-resistant, no soaking in bathtubs.  Remove dressing after post op day 5-7, then leave incision open to air. Keep incision clean and dry.  Try to have regular bowel movements, take stool softener or laxative if necessary.  May take Pepcid or Zantac for upset stomach.  May take Aleve or Naproxen instead of Meloxicam/Celebrex.  Swelling may travel all the way down leg to foot, this is normal and will subside in a few weeks.  Call to schedule an appt with Dr. Waite for follow up, if you have staples or sutures they will be removed in office.  Contact your doctor if you experience: fever greater than 101.5, chills, chest pain, difficulty breathing, redness or excessive drainage around the incision, other concerns.  Follow up with your primary care provider.   You are on coumadin for a history of blood clots. You should resume taking this medication as you did pre-operatively. Because you skipped a few days of this medication prior to surgery, your INR, a level of coumadin is decreased and is likely not "therapeutic". A therapeutic INR which helps to prevent clotting with a-fib is 2.0-3.0. On the morning of discharge, your INR was 1.89. Since this is not "therapeutic", you will need to take an additional anticoagulant injection called "lovenox" (enoxaparin) twice daily until your INR is >2.0. You should have your INR checked on Monday, January 24 and results faxed to your primary care doctor, Dr. Fariba Traore so that she  can assess your INR levels and tell you when to stop the lovenox (enoxaparin) injections (you should stop the injections when your INR is greater than 2.0) If you have any problems getting your blood taken, or your results, please contact Dr. Morrison, who will follow these levels. Continue to take your coumadin daily while taking these injections until Dr. Azul Traore tells you to stop. If you notice any bleeding (such as nose bleeds, uterine bleeds, black tarry stools, severely swollen and bruised knee), please contact your doctor right away.      Weight bear as tolerated with assistive device. Keep knee in knee immobilizer at all times.  No strenuous activity, heavy lifting, driving or returning to work until cleared by MD.  Do not get ace wrap/ dressing wet. Do not remove dressing. Sponge bathe only.  Keep incision clean and dry.  Try to have regular bowel movements, take stool softener or laxative if necessary.  May take Pepcid or Zantac for upset stomach.    Swelling may travel all the way down leg to foot, this is normal and will subside in a few weeks.  Call to schedule an appt with Dr. Waite for follow up, if you have staples or sutures they will be removed in office.  Contact your doctor if you experience: fever greater than 101.5, chills, chest pain, difficulty breathing, redness or excessive drainage around the incision, other concerns.  Follow up with your primary care provider.   You are on coumadin for a history of blood clots. Your coumadin was resumed post-operatively after being held 1/17-1/19. Continue taking this medication as you did pre-operatively. (4mg daily, and 5mg on Thursdays). Because you skipped a few days of this medication prior to surgery, your INR, a level of coumadin is decreased and is likely not "therapeutic". A therapeutic INR which helps to prevent clotting with a-fib is 2.0-3.0.  Since this is not "therapeutic", you will need to take an additional anticoagulant injection called "lovenox" (enoxaparin) twice daily until your INR is >2.0. You should have your INR checked daily while at rehab until your INR is >2.0.  Continue to take your coumadin daily while taking these injections until your INR is over 2. When your INR is over 2, you should stop the lovenox injections. If you notice any bleeding (such as nose bleeds, uterine bleeds, black tarry stools, severely swollen and bruised knee), please contact your doctor right away.      Weight bear as tolerated with assistive device. Keep knee in knee immobilizer at all times.  No strenuous activity, heavy lifting, driving or returning to work until cleared by MD.  Do not get ace wrap/ dressing wet. Do not remove dressing. Sponge bathe only.  Keep incision clean and dry.  Try to have regular bowel movements, take stool softener or laxative if necessary.  May take Pepcid or Zantac for upset stomach.    Swelling may travel all the way down leg to foot, this is normal and will subside in a few weeks.  Call to schedule an appt with Dr. Waite for follow up, if you have staples or sutures they will be removed in office.  Contact your doctor if you experience: fever greater than 101.5, chills, chest pain, difficulty breathing, redness or excessive drainage around the incision, other concerns.  Follow up with your primary care provider.   You are on coumadin for a history of blood clots. Your coumadin was resumed post-operatively after being held 1/17-1/19. Continue taking this medication as you did pre-operatively. (4mg daily, and 5mg on Thursdays). Because you skipped a few days of this medication prior to surgery, your INR, a level of coumadin is decreased and is likely not "therapeutic". A therapeutic INR which helps to prevent clotting with a-fib is 2.0-3.0.  Since this is not "therapeutic", you will need to take an additional anticoagulant injection called "lovenox" (enoxaparin) twice daily until your INR is >2.0. You should have your INR checked daily while at rehab until your INR is >2.0.  Continue to take your coumadin daily while taking these injections until your INR is over 2. When your INR is over 2, you should stop the lovenox injections. If you notice any bleeding (such as nose bleeds, uterine bleeds, black tarry stools, severely swollen and bruised knee), please contact your doctor right away.      Weight bear as tolerated with assistive device. ***Keep knee in knee immobilizer at all times.***  No strenuous activity, heavy lifting, driving or returning to work until cleared by MD.  Do not get ace wrap/ dressing wet. Do not remove dressing. Sponge bathe only.  Keep incision clean and dry.  Try to have regular bowel movements, take stool softener or laxative if necessary.  May take Pepcid or Zantac for upset stomach.    Swelling may travel all the way down leg to foot, this is normal and will subside in a few weeks. You may elevate the leg at the level of the ankle, with knee immobilizer on, to help with swelling.   Call to schedule an appt with Dr. Waite for follow up, if you have staples or sutures they will be removed in office.  Contact your doctor if you experience: fever greater than 101.5, chills, chest pain, difficulty breathing, redness or excessive drainage around the incision, other concerns.  Follow up with your primary care provider.

## 2022-01-19 NOTE — DISCHARGE NOTE PROVIDER - NSDCCPCAREPLAN_GEN_ALL_CORE_FT
PRINCIPAL DISCHARGE DIAGNOSIS  Diagnosis: Patellar fracture  Assessment and Plan of Treatment:

## 2022-01-19 NOTE — H&P ADULT - HISTORY OF PRESENT ILLNESS
81 F PMH breast ca (in remission), unknown clotting disorder (on coumadin, last dose 1/17) HLD who was transferred from [facility] w/ R patella fx after sustaining mech fall on R knee and R hand this afternoon. Pt endorsed immediate pain and swelling of R knee after injury, and states that she was unable to bear weight on RLE after injury. She was subsequently brought to [facility]. Denies nu,bness/tingling. Endorses limited ROM of R knee 2/2 pain but denies new motor deficits. Denies head strike. Denies LOC. States that her fingers feel sore but states that she has full ROM of b/l fingers and wrist.

## 2022-01-19 NOTE — CONSULT NOTE ADULT - ATTENDING COMMENTS
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   Hypercoag work up was positive, no records available.   No VTE otherwise.   She is currently receiving intraocular injections monthly.   Suggest bridging to full AC as soon as deemed safe by ortho.   Plaease obtain records re thombotic history from [place].

## 2022-01-19 NOTE — DISCHARGE NOTE PROVIDER - HOSPITAL COURSE
Admitted 1/18/22 with R patella fracture  Pre-op medical clearance and optimization, coumadin held pre-operatively (last dose taken 1/17/22 at home)  Hematology consult for magdaleno-op recommendations on anticoagulation, h/o clotting disorder  INR decreased to 1.83 prior to OR without vitamin K; plan to resume coumadin post-op and bridge with lovenox (1mg/kg bid) until   INR >2.0   Surgery- R patella ORIF 1/19/22  Magdaleno-op Antibiotics  Pain control  DVT prophylaxis- home dose of coumadin with therapeutic lovenox bridge until INR >2.0  OOB/Physical Therapy   Admitted 1/18/22 with R patella fracture  Pre-op medical clearance and optimization, coumadin held pre-operatively (last dose taken 1/17/22 at home)  Hematology consult for magdaleno-op recommendations on anticoagulation, h/o clotting disorder  INR decreased to 1.83 prior to OR without vitamin K; plan to resume coumadin post-op and bridge with lovenox (1mg/kg bid) until   INR >2.0   Surgery- R patella ORIF 1/19/22  Magdaleno-op Antibiotics  Pain control  DVT prophylaxis- home dose of coumadin daily with therapeutic lovenox bridge of 60mg subcutaneous twice daily until INR >2.0  OOB/Physical Therapy   Admitted 1/18/22 with R patella fracture  Pre-op medical clearance and optimization, coumadin held pre-operatively (last dose taken 1/17/22 at home)  Hematology consult for magdaleno-op recommendations on anticoagulation, h/o clotting disorder  INR decreased to 1.83 prior to OR without vitamin K; plan to resume coumadin post-op and bridge with lovenox (1mg/kg bid) until   INR >2.0   Surgery- R patella ORIF 1/19/22  Magdaleno-op Antibiotics  Pain control  DVT prophylaxis- home dose of coumadin daily with therapeutic lovenox bridge of 60mg subcutaneous twice daily until INR >2.0. CHECK PT/INR DAILY until INR >2.0  OOB/Physical Therapy   Admitted 1/18/22 with R patella fracture  Pre-op medical clearance and optimization, coumadin held pre-operatively (last dose taken 1/17/22 at home)  Hematology consult for magdaleno-op recommendations on anticoagulation, h/o clotting disorder  INR decreased to 1.83 prior to OR without vitamin K; plan to resume coumadin post-op and bridge with lovenox (1mg/kg bid) until   INR >2.0   Surgery- R patella ORIF 1/19/22  Magdaleno-op Antibiotics  Pain control  DVT prophylaxis- home dose of coumadin daily with therapeutic lovenox bridge of 60mg subcutaneous twice daily until INR >2.0. CHECK PT/INR DAILY until INR >2.0  OOB/Physical Therapy  CT PE for tachycardia- negative for PE

## 2022-01-19 NOTE — PATIENT PROFILE ADULT - FALL HARM RISK - HARM RISK INTERVENTIONS

## 2022-01-19 NOTE — H&P ADULT - NSHPSOCIALHISTORY_GEN_ALL_CORE
Lives in [facility] in [place], has elevator in building so does not need to climb stairs

## 2022-01-20 DIAGNOSIS — D68.52 PROTHROMBIN GENE MUTATION: ICD-10-CM

## 2022-01-20 DIAGNOSIS — E78.5 HYPERLIPIDEMIA, UNSPECIFIED: ICD-10-CM

## 2022-01-20 DIAGNOSIS — S82.001A UNSPECIFIED FRACTURE OF RIGHT PATELLA, INITIAL ENCOUNTER FOR CLOSED FRACTURE: ICD-10-CM

## 2022-01-20 LAB
ANION GAP SERPL CALC-SCNC: 10 MMOL/L — SIGNIFICANT CHANGE UP (ref 5–17)
ANION GAP SERPL CALC-SCNC: 11 MMOL/L — SIGNIFICANT CHANGE UP (ref 5–17)
BUN SERPL-MCNC: 20 MG/DL — SIGNIFICANT CHANGE UP (ref 7–23)
BUN SERPL-MCNC: 21 MG/DL — SIGNIFICANT CHANGE UP (ref 7–23)
CALCIUM SERPL-MCNC: 8.9 MG/DL — SIGNIFICANT CHANGE UP (ref 8.4–10.5)
CALCIUM SERPL-MCNC: 8.9 MG/DL — SIGNIFICANT CHANGE UP (ref 8.4–10.5)
CHLORIDE SERPL-SCNC: 101 MMOL/L — SIGNIFICANT CHANGE UP (ref 96–108)
CHLORIDE SERPL-SCNC: 102 MMOL/L — SIGNIFICANT CHANGE UP (ref 96–108)
CO2 SERPL-SCNC: 23 MMOL/L — SIGNIFICANT CHANGE UP (ref 22–31)
CO2 SERPL-SCNC: 24 MMOL/L — SIGNIFICANT CHANGE UP (ref 22–31)
CREAT SERPL-MCNC: 0.64 MG/DL — SIGNIFICANT CHANGE UP (ref 0.5–1.3)
CREAT SERPL-MCNC: 0.65 MG/DL — SIGNIFICANT CHANGE UP (ref 0.5–1.3)
GLUCOSE SERPL-MCNC: 141 MG/DL — HIGH (ref 70–99)
GLUCOSE SERPL-MCNC: 146 MG/DL — HIGH (ref 70–99)
HCT VFR BLD CALC: 35.3 % — SIGNIFICANT CHANGE UP (ref 34.5–45)
HCT VFR BLD CALC: 35.3 % — SIGNIFICANT CHANGE UP (ref 34.5–45)
HGB BLD-MCNC: 11.4 G/DL — LOW (ref 11.5–15.5)
HGB BLD-MCNC: 12 G/DL — SIGNIFICANT CHANGE UP (ref 11.5–15.5)
INR BLD: 1.81 — HIGH (ref 0.88–1.16)
INR BLD: 1.89 — HIGH (ref 0.88–1.16)
MCHC RBC-ENTMCNC: 30 PG — SIGNIFICANT CHANGE UP (ref 27–34)
MCHC RBC-ENTMCNC: 31.3 PG — SIGNIFICANT CHANGE UP (ref 27–34)
MCHC RBC-ENTMCNC: 32.3 GM/DL — SIGNIFICANT CHANGE UP (ref 32–36)
MCHC RBC-ENTMCNC: 34 GM/DL — SIGNIFICANT CHANGE UP (ref 32–36)
MCV RBC AUTO: 92.2 FL — SIGNIFICANT CHANGE UP (ref 80–100)
MCV RBC AUTO: 92.9 FL — SIGNIFICANT CHANGE UP (ref 80–100)
NRBC # BLD: 0 /100 WBCS — SIGNIFICANT CHANGE UP (ref 0–0)
NRBC # BLD: 0 /100 WBCS — SIGNIFICANT CHANGE UP (ref 0–0)
PLATELET # BLD AUTO: 251 K/UL — SIGNIFICANT CHANGE UP (ref 150–400)
PLATELET # BLD AUTO: 272 K/UL — SIGNIFICANT CHANGE UP (ref 150–400)
POTASSIUM SERPL-MCNC: 4.3 MMOL/L — SIGNIFICANT CHANGE UP (ref 3.5–5.3)
POTASSIUM SERPL-MCNC: 4.3 MMOL/L — SIGNIFICANT CHANGE UP (ref 3.5–5.3)
POTASSIUM SERPL-SCNC: 4.3 MMOL/L — SIGNIFICANT CHANGE UP (ref 3.5–5.3)
POTASSIUM SERPL-SCNC: 4.3 MMOL/L — SIGNIFICANT CHANGE UP (ref 3.5–5.3)
PROTHROM AB SERPL-ACNC: 21.1 SEC — HIGH (ref 10.6–13.6)
PROTHROM AB SERPL-ACNC: 22 SEC — HIGH (ref 10.6–13.6)
RBC # BLD: 3.8 M/UL — SIGNIFICANT CHANGE UP (ref 3.8–5.2)
RBC # BLD: 3.83 M/UL — SIGNIFICANT CHANGE UP (ref 3.8–5.2)
RBC # FLD: 14 % — SIGNIFICANT CHANGE UP (ref 10.3–14.5)
RBC # FLD: 14.2 % — SIGNIFICANT CHANGE UP (ref 10.3–14.5)
SODIUM SERPL-SCNC: 135 MMOL/L — SIGNIFICANT CHANGE UP (ref 135–145)
SODIUM SERPL-SCNC: 136 MMOL/L — SIGNIFICANT CHANGE UP (ref 135–145)
WBC # BLD: 10.72 K/UL — HIGH (ref 3.8–10.5)
WBC # BLD: 11.42 K/UL — HIGH (ref 3.8–10.5)
WBC # FLD AUTO: 10.72 K/UL — HIGH (ref 3.8–10.5)
WBC # FLD AUTO: 11.42 K/UL — HIGH (ref 3.8–10.5)

## 2022-01-20 PROCEDURE — 99233 SBSQ HOSP IP/OBS HIGH 50: CPT

## 2022-01-20 RX ORDER — WARFARIN SODIUM 2.5 MG/1
5 TABLET ORAL AT BEDTIME
Refills: 0 | Status: DISCONTINUED | OUTPATIENT
Start: 2022-01-20 | End: 2022-01-20

## 2022-01-20 RX ORDER — ACETAMINOPHEN 500 MG
3 TABLET ORAL
Qty: 0 | Refills: 0 | DISCHARGE
Start: 2022-01-20

## 2022-01-20 RX ORDER — POLYETHYLENE GLYCOL 3350 17 G/17G
17 POWDER, FOR SOLUTION ORAL DAILY
Refills: 0 | Status: DISCONTINUED | OUTPATIENT
Start: 2022-01-20 | End: 2022-01-22

## 2022-01-20 RX ORDER — ENOXAPARIN SODIUM 100 MG/ML
60 INJECTION SUBCUTANEOUS
Refills: 0 | Status: DISCONTINUED | OUTPATIENT
Start: 2022-01-20 | End: 2022-01-22

## 2022-01-20 RX ORDER — WARFARIN SODIUM 2.5 MG/1
1 TABLET ORAL
Qty: 0 | Refills: 0 | DISCHARGE

## 2022-01-20 RX ORDER — SENNA PLUS 8.6 MG/1
2 TABLET ORAL AT BEDTIME
Refills: 0 | Status: DISCONTINUED | OUTPATIENT
Start: 2022-01-20 | End: 2022-01-22

## 2022-01-20 RX ORDER — HYDROMORPHONE HYDROCHLORIDE 2 MG/ML
0.5 INJECTION INTRAMUSCULAR; INTRAVENOUS; SUBCUTANEOUS EVERY 4 HOURS
Refills: 0 | Status: DISCONTINUED | OUTPATIENT
Start: 2022-01-20 | End: 2022-01-22

## 2022-01-20 RX ORDER — OXYCODONE HYDROCHLORIDE 5 MG/1
10 TABLET ORAL EVERY 4 HOURS
Refills: 0 | Status: DISCONTINUED | OUTPATIENT
Start: 2022-01-20 | End: 2022-01-22

## 2022-01-20 RX ORDER — OXYCODONE HYDROCHLORIDE 5 MG/1
5 TABLET ORAL EVERY 4 HOURS
Refills: 0 | Status: DISCONTINUED | OUTPATIENT
Start: 2022-01-20 | End: 2022-01-22

## 2022-01-20 RX ORDER — MAGNESIUM HYDROXIDE 400 MG/1
30 TABLET, CHEWABLE ORAL DAILY
Refills: 0 | Status: DISCONTINUED | OUTPATIENT
Start: 2022-01-20 | End: 2022-01-22

## 2022-01-20 RX ORDER — CEFAZOLIN SODIUM 1 G
2000 VIAL (EA) INJECTION EVERY 8 HOURS
Refills: 0 | Status: COMPLETED | OUTPATIENT
Start: 2022-01-20 | End: 2022-01-20

## 2022-01-20 RX ORDER — SODIUM CHLORIDE 9 MG/ML
1000 INJECTION, SOLUTION INTRAVENOUS
Refills: 0 | Status: DISCONTINUED | OUTPATIENT
Start: 2022-01-21 | End: 2022-01-22

## 2022-01-20 RX ORDER — WARFARIN SODIUM 2.5 MG/1
5 TABLET ORAL AT BEDTIME
Refills: 0 | Status: COMPLETED | OUTPATIENT
Start: 2022-01-20 | End: 2022-01-20

## 2022-01-20 RX ORDER — ACETAMINOPHEN 500 MG
975 TABLET ORAL EVERY 8 HOURS
Refills: 0 | Status: DISCONTINUED | OUTPATIENT
Start: 2022-01-20 | End: 2022-01-22

## 2022-01-20 RX ADMIN — OXYCODONE HYDROCHLORIDE 5 MILLIGRAM(S): 5 TABLET ORAL at 11:42

## 2022-01-20 RX ADMIN — Medication 975 MILLIGRAM(S): at 15:51

## 2022-01-20 RX ADMIN — OXYCODONE HYDROCHLORIDE 10 MILLIGRAM(S): 5 TABLET ORAL at 23:28

## 2022-01-20 RX ADMIN — OXYCODONE HYDROCHLORIDE 10 MILLIGRAM(S): 5 TABLET ORAL at 22:28

## 2022-01-20 RX ADMIN — ENOXAPARIN SODIUM 60 MILLIGRAM(S): 100 INJECTION SUBCUTANEOUS at 07:04

## 2022-01-20 RX ADMIN — Medication 975 MILLIGRAM(S): at 06:35

## 2022-01-20 RX ADMIN — ATORVASTATIN CALCIUM 20 MILLIGRAM(S): 80 TABLET, FILM COATED ORAL at 22:28

## 2022-01-20 RX ADMIN — Medication 100 MILLIGRAM(S): at 06:35

## 2022-01-20 RX ADMIN — Medication 975 MILLIGRAM(S): at 07:35

## 2022-01-20 RX ADMIN — WARFARIN SODIUM 5 MILLIGRAM(S): 2.5 TABLET ORAL at 22:29

## 2022-01-20 RX ADMIN — OXYCODONE HYDROCHLORIDE 5 MILLIGRAM(S): 5 TABLET ORAL at 12:29

## 2022-01-20 RX ADMIN — HYDROMORPHONE HYDROCHLORIDE 0.5 MILLIGRAM(S): 2 INJECTION INTRAMUSCULAR; INTRAVENOUS; SUBCUTANEOUS at 01:44

## 2022-01-20 RX ADMIN — Medication 975 MILLIGRAM(S): at 22:28

## 2022-01-20 RX ADMIN — Medication 100 MILLIGRAM(S): at 14:01

## 2022-01-20 RX ADMIN — ENOXAPARIN SODIUM 60 MILLIGRAM(S): 100 INJECTION SUBCUTANEOUS at 19:21

## 2022-01-20 RX ADMIN — ESCITALOPRAM OXALATE 10 MILLIGRAM(S): 10 TABLET, FILM COATED ORAL at 11:39

## 2022-01-20 RX ADMIN — SENNA PLUS 2 TABLET(S): 8.6 TABLET ORAL at 22:27

## 2022-01-20 RX ADMIN — Medication 975 MILLIGRAM(S): at 14:01

## 2022-01-20 RX ADMIN — Medication 975 MILLIGRAM(S): at 23:28

## 2022-01-20 NOTE — PHYSICAL THERAPY INITIAL EVALUATION ADULT - MODALITIES TREATMENT COMMENTS
Supine, bilateral LEs: glute sets, quad sets, heel slides - LLE only, ankle pumps (all through full range, 1 set, 10 reps). Patient tolerated therex well. Educated patient to perform therex regimen 3-5x/day, patient verbalized understanding; written handout provided.

## 2022-01-20 NOTE — BRIEF OPERATIVE NOTE - OPERATION/FINDINGS
Tension Band Technique of transverse patellar fracture using 1.6mm K-wires x 2 and Arthrex Cerclage Fibertape; Fibertape around the world Suture Cerclage x 2

## 2022-01-20 NOTE — DIETITIAN INITIAL EVALUATION ADULT. - PERSON TAUGHT/METHOD
Pt was educated on increased needs post-op. Discussed optimal nutrient dense foods high in protein. Pt was receptive and expressed understanding./verbal instruction/patient instructed

## 2022-01-20 NOTE — DIETITIAN INITIAL EVALUATION ADULT. - OTHER INFO
81yFemale POD#0 (late case) s/p R patella ORIF    Patient seen in room, resting in bed. Currently on a regular diet and tolerating PO. Had 75% of a cheese omelette w/ onions. Saving the banana and bread as a snack later. Denies GI distress, no N/V, has yet to have a BM since OR. Reports h/o acid reflux and avoiding acidic foods. Discussed posture at meal times 2/2 inclination to eat leaning back in hospital bed. Pt receptive and agreeable. Denies weight changes, states UBW is 137lbs, admitted wt was 138lbs. NKFA; avoids red meat 2/2 elevated cholesterol. Tries to walk 5000 steps/day. Skin: R knee incision covered with Ace wrap. RD to follow.

## 2022-01-20 NOTE — DIETITIAN INITIAL EVALUATION ADULT. - OTHER CALCULATIONS
IBW used for calculations as pt >120% of IBW (139%). Nutrient needs based on [facility] standards of care for maintenance in older adults. Needs adjusted for post-op demands.

## 2022-01-20 NOTE — PHYSICAL THERAPY INITIAL EVALUATION ADULT - ADDITIONAL COMMENTS
Pt lives with her partner in an apartment, no CHIRAG +elevator.  Pt denies recent Assistive Device use or history of falls.

## 2022-01-20 NOTE — PACU DISCHARGE NOTE - COMMENTS
Patient is A&OX4. S/P Pain med with some relief. VSS. Due to void. Had period of confusion but was able to be reoriented quickly. Tolerated PO. Right knee with leg immobilizer . Aquacel and ace wrap in place, dry and intact. LR at 80cc/hr. Met PACU requirements. Report given to floor RN. Patient taken down to room by transporter in NAD

## 2022-01-21 DIAGNOSIS — R00.0 TACHYCARDIA, UNSPECIFIED: ICD-10-CM

## 2022-01-21 DIAGNOSIS — R79.1 ABNORMAL COAGULATION PROFILE: ICD-10-CM

## 2022-01-21 LAB
ANION GAP SERPL CALC-SCNC: 10 MMOL/L — SIGNIFICANT CHANGE UP (ref 5–17)
BUN SERPL-MCNC: 18 MG/DL — SIGNIFICANT CHANGE UP (ref 7–23)
CALCIUM SERPL-MCNC: 8.8 MG/DL — SIGNIFICANT CHANGE UP (ref 8.4–10.5)
CHLORIDE SERPL-SCNC: 104 MMOL/L — SIGNIFICANT CHANGE UP (ref 96–108)
CO2 SERPL-SCNC: 26 MMOL/L — SIGNIFICANT CHANGE UP (ref 22–31)
CREAT SERPL-MCNC: 0.55 MG/DL — SIGNIFICANT CHANGE UP (ref 0.5–1.3)
GLUCOSE SERPL-MCNC: 123 MG/DL — HIGH (ref 70–99)
HCT VFR BLD CALC: 35 % — SIGNIFICANT CHANGE UP (ref 34.5–45)
HGB BLD-MCNC: 11.5 G/DL — SIGNIFICANT CHANGE UP (ref 11.5–15.5)
INR BLD: 1.62 — HIGH (ref 0.88–1.16)
MCHC RBC-ENTMCNC: 30.7 PG — SIGNIFICANT CHANGE UP (ref 27–34)
MCHC RBC-ENTMCNC: 32.9 GM/DL — SIGNIFICANT CHANGE UP (ref 32–36)
MCV RBC AUTO: 93.3 FL — SIGNIFICANT CHANGE UP (ref 80–100)
NRBC # BLD: 0 /100 WBCS — SIGNIFICANT CHANGE UP (ref 0–0)
PLATELET # BLD AUTO: 240 K/UL — SIGNIFICANT CHANGE UP (ref 150–400)
POTASSIUM SERPL-MCNC: 3.9 MMOL/L — SIGNIFICANT CHANGE UP (ref 3.5–5.3)
POTASSIUM SERPL-SCNC: 3.9 MMOL/L — SIGNIFICANT CHANGE UP (ref 3.5–5.3)
PROTHROM AB SERPL-ACNC: 19 SEC — HIGH (ref 10.6–13.6)
RBC # BLD: 3.75 M/UL — LOW (ref 3.8–5.2)
RBC # FLD: 14.2 % — SIGNIFICANT CHANGE UP (ref 10.3–14.5)
SODIUM SERPL-SCNC: 140 MMOL/L — SIGNIFICANT CHANGE UP (ref 135–145)
WBC # BLD: 9.36 K/UL — SIGNIFICANT CHANGE UP (ref 3.8–10.5)
WBC # FLD AUTO: 9.36 K/UL — SIGNIFICANT CHANGE UP (ref 3.8–10.5)

## 2022-01-21 PROCEDURE — 99221 1ST HOSP IP/OBS SF/LOW 40: CPT

## 2022-01-21 RX ORDER — OXYCODONE HYDROCHLORIDE 5 MG/1
1 TABLET ORAL
Qty: 0 | Refills: 0 | DISCHARGE
Start: 2022-01-21

## 2022-01-21 RX ORDER — POLYETHYLENE GLYCOL 3350 17 G/17G
17 POWDER, FOR SOLUTION ORAL
Qty: 0 | Refills: 0 | DISCHARGE
Start: 2022-01-21

## 2022-01-21 RX ORDER — ENOXAPARIN SODIUM 100 MG/ML
0 INJECTION SUBCUTANEOUS
Qty: 0 | Refills: 0 | DISCHARGE
Start: 2022-01-21

## 2022-01-21 RX ORDER — SENNA PLUS 8.6 MG/1
2 TABLET ORAL
Qty: 0 | Refills: 0 | DISCHARGE
Start: 2022-01-21

## 2022-01-21 RX ORDER — WARFARIN SODIUM 2.5 MG/1
4 TABLET ORAL AT BEDTIME
Refills: 0 | Status: COMPLETED | OUTPATIENT
Start: 2022-01-21 | End: 2022-01-21

## 2022-01-21 RX ADMIN — Medication 975 MILLIGRAM(S): at 13:06

## 2022-01-21 RX ADMIN — Medication 975 MILLIGRAM(S): at 21:47

## 2022-01-21 RX ADMIN — Medication 975 MILLIGRAM(S): at 05:43

## 2022-01-21 RX ADMIN — OXYCODONE HYDROCHLORIDE 10 MILLIGRAM(S): 5 TABLET ORAL at 22:47

## 2022-01-21 RX ADMIN — Medication 975 MILLIGRAM(S): at 12:06

## 2022-01-21 RX ADMIN — WARFARIN SODIUM 4 MILLIGRAM(S): 2.5 TABLET ORAL at 21:47

## 2022-01-21 RX ADMIN — ATORVASTATIN CALCIUM 20 MILLIGRAM(S): 80 TABLET, FILM COATED ORAL at 21:47

## 2022-01-21 RX ADMIN — ENOXAPARIN SODIUM 60 MILLIGRAM(S): 100 INJECTION SUBCUTANEOUS at 06:06

## 2022-01-21 RX ADMIN — Medication 975 MILLIGRAM(S): at 06:43

## 2022-01-21 RX ADMIN — OXYCODONE HYDROCHLORIDE 10 MILLIGRAM(S): 5 TABLET ORAL at 21:47

## 2022-01-21 RX ADMIN — ENOXAPARIN SODIUM 60 MILLIGRAM(S): 100 INJECTION SUBCUTANEOUS at 18:20

## 2022-01-21 RX ADMIN — ESCITALOPRAM OXALATE 10 MILLIGRAM(S): 10 TABLET, FILM COATED ORAL at 11:40

## 2022-01-21 RX ADMIN — Medication 975 MILLIGRAM(S): at 22:47

## 2022-01-21 RX ADMIN — POLYETHYLENE GLYCOL 3350 17 GRAM(S): 17 POWDER, FOR SOLUTION ORAL at 11:40

## 2022-01-21 RX ADMIN — SENNA PLUS 2 TABLET(S): 8.6 TABLET ORAL at 21:47

## 2022-01-21 NOTE — OCCUPATIONAL THERAPY INITIAL EVALUATION ADULT - RANGE OF MOTION EXAMINATION, LOWER EXTREMITY
RLE ROM limited by knee immobilizer and pain, able to demo ankle PF/DF WFL/Left LE Active ROM was WFL (within functional limits)

## 2022-01-21 NOTE — OCCUPATIONAL THERAPY INITIAL EVALUATION ADULT - RANGE OF MOTION EXAMINATION, UPPER EXTREMITY
RUE AROM WFL with exception of shoulder flexion and abduction limited 2/2 old rotator cuff injury ~0-90/Left UE Active ROM was WFL (within functional limits)

## 2022-01-21 NOTE — OCCUPATIONAL THERAPY INITIAL EVALUATION ADULT - MODIFIED CLINICAL TEST OF SENSORY INTEGRATION IN BALANCE TEST
Pt limited by increasing R knee pain with WBing, unable to weight shift onto RLE to take steps forwards, was able to perform ~4 side steps with modx1 assist with RW.

## 2022-01-21 NOTE — OCCUPATIONAL THERAPY INITIAL EVALUATION ADULT - ADDITIONAL COMMENTS
Pt lives with her partner in an apartment, no CHIRAG, has a tub with no grab bars. Prior to admit, pt states she was independent with ADLs/IADLs, regularly going to the gym, and did not require any DME or AD.

## 2022-01-21 NOTE — OCCUPATIONAL THERAPY INITIAL EVALUATION ADULT - GENERAL OBSERVATIONS, REHAB EVAL
Pt received semi-supine in bed, +R knee immobilizer, +primafit, +heplock, +SCDs, in NAD and agreeable to OT. Cleared by TYRA cagle to see.

## 2022-01-21 NOTE — OCCUPATIONAL THERAPY INITIAL EVALUATION ADULT - PERTINENT HX OF CURRENT PROBLEM, REHAB EVAL
81 F PMH breast ca (in remission), unknown clotting disorder (on coumadin, last dose 1/17) HLD who was transferred from [facility] w/ R patella fx after sustaining mech fall on R knee and R hand this afternoon. Pt endorsed immediate pain and swelling of R knee after injury, and states that she was unable to bear weight on RLE after injury.

## 2022-01-21 NOTE — OCCUPATIONAL THERAPY INITIAL EVALUATION ADULT - DIAGNOSIS, OT EVAL
Pt is s/p R patella ORIF (1/19) presents with c/o of R knee pain with WBing, decreased activity tolerance, impaired balance, and generalized weakness impacting overall ease of ADLs and transfers.

## 2022-01-21 NOTE — PROGRESS NOTE ADULT - PROBLEM SELECTOR PLAN 2
Coumadin held perioperatively; cont. LMWH; Ortho and Heme to decide when to resume Coumadin; monitor coags; monitor knee for signs of bleeding
cont coumadin and monitor INR

## 2022-01-22 VITALS
TEMPERATURE: 98 F | SYSTOLIC BLOOD PRESSURE: 129 MMHG | HEART RATE: 103 BPM | OXYGEN SATURATION: 93 % | RESPIRATION RATE: 20 BRPM | DIASTOLIC BLOOD PRESSURE: 78 MMHG

## 2022-01-22 LAB
ANION GAP SERPL CALC-SCNC: 8 MMOL/L — SIGNIFICANT CHANGE UP (ref 5–17)
BUN SERPL-MCNC: 17 MG/DL — SIGNIFICANT CHANGE UP (ref 7–23)
CALCIUM SERPL-MCNC: 9 MG/DL — SIGNIFICANT CHANGE UP (ref 8.4–10.5)
CHLORIDE SERPL-SCNC: 102 MMOL/L — SIGNIFICANT CHANGE UP (ref 96–108)
CO2 SERPL-SCNC: 26 MMOL/L — SIGNIFICANT CHANGE UP (ref 22–31)
CREAT SERPL-MCNC: 0.56 MG/DL — SIGNIFICANT CHANGE UP (ref 0.5–1.3)
GLUCOSE SERPL-MCNC: 111 MG/DL — HIGH (ref 70–99)
HCT VFR BLD CALC: 34.7 % — SIGNIFICANT CHANGE UP (ref 34.5–45)
HGB BLD-MCNC: 11.1 G/DL — LOW (ref 11.5–15.5)
INR BLD: 1.8 — HIGH (ref 0.88–1.16)
MCHC RBC-ENTMCNC: 29.9 PG — SIGNIFICANT CHANGE UP (ref 27–34)
MCHC RBC-ENTMCNC: 32 GM/DL — SIGNIFICANT CHANGE UP (ref 32–36)
MCV RBC AUTO: 93.5 FL — SIGNIFICANT CHANGE UP (ref 80–100)
NRBC # BLD: 0 /100 WBCS — SIGNIFICANT CHANGE UP (ref 0–0)
PLATELET # BLD AUTO: 250 K/UL — SIGNIFICANT CHANGE UP (ref 150–400)
POTASSIUM SERPL-MCNC: 3.5 MMOL/L — SIGNIFICANT CHANGE UP (ref 3.5–5.3)
POTASSIUM SERPL-SCNC: 3.5 MMOL/L — SIGNIFICANT CHANGE UP (ref 3.5–5.3)
PROTHROM AB SERPL-ACNC: 21 SEC — HIGH (ref 10.6–13.6)
RBC # BLD: 3.71 M/UL — LOW (ref 3.8–5.2)
RBC # FLD: 14.6 % — HIGH (ref 10.3–14.5)
SODIUM SERPL-SCNC: 136 MMOL/L — SIGNIFICANT CHANGE UP (ref 135–145)
WBC # BLD: 7.71 K/UL — SIGNIFICANT CHANGE UP (ref 3.8–10.5)
WBC # FLD AUTO: 7.71 K/UL — SIGNIFICANT CHANGE UP (ref 3.8–10.5)

## 2022-01-22 PROCEDURE — 71275 CT ANGIOGRAPHY CHEST: CPT | Mod: 26

## 2022-01-22 RX ADMIN — Medication 975 MILLIGRAM(S): at 07:04

## 2022-01-22 RX ADMIN — ENOXAPARIN SODIUM 60 MILLIGRAM(S): 100 INJECTION SUBCUTANEOUS at 06:03

## 2022-01-22 RX ADMIN — Medication 975 MILLIGRAM(S): at 06:04

## 2022-01-22 NOTE — PROGRESS NOTE ADULT - SUBJECTIVE AND OBJECTIVE BOX
Ortho Note    Pt comfortable without complaints, pain controlled  Denies CP, SOB, N/V, numbness/tingling. 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 Coumadin has been help since 1/17 evening. Her INR is usually ~ 2.1 on current regimen. Hematology consulted for pre and post-op recommendations.     Vital Signs Last 24 Hrs  T(C): 36.8 (01-19-22 @ 08:44), Max: 37.1 (01-19-22 @ 05:25)  T(F): 98.2 (01-19-22 @ 08:44), Max: 98.8 (01-19-22 @ 05:25)  HR: 81 (01-19-22 @ 08:44) (81 - 99)  BP: 126/77 (01-19-22 @ 08:44) (126/77 - 135/81)  BP(mean): 99 (01-19-22 @ 05:25) (99 - 99)  RR: 16 (01-19-22 @ 08:44) (16 - 18)  SpO2: 93% (01-19-22 @ 08:44) (93% - 93%)  AVSS    General: Pt Alert and oriented, NAD  R knee swollen and in KI, no erythema or drainage  Pulses: +2DP, WWP feet  Sensation: SILT BLE  Motor: 5/5 EHL/FHL/TA/GS                          12.1   7.70  )-----------( 255      ( 19 Jan 2022 05:30 )             37.1     01-19    139  |  104  |  20  ----------------------------<  111<H>  4.1   |  24  |  0.56    Ca    8.9      19 Jan 2022 05:30        A/P: 81yFemale s/p R patella fracture  - elevated INR- 2.03, Will recheck in early afternoon, if remains elevated will consider oral vitamin K pre-op  - Pain Control  - DVT ppx: SCDs, holding home dose of coumadin/ additional chemoppx for OR today  - PT, WBS: NWB RLE  - hematology consulted for hypercoaguable state- recommend to resume coumadin post-op and bridge with therapeutic lvs (1mg/kh)  - NPO/ IVF  - med clearance for OR  - dispo: OR today, added on    Ortho Pager [phone]
Ortho Progress Note    Procedure: R Patella ORIF  Surgeon: Mariann    Pt comfortable without complaints, pain controlled with tylenol. No issues yesterday.   Denies CP, SOB, N/V, numbness/tingling     Vital Signs Last 24 Hrs  T(C): 36.9 (21 Jan 2022 04:35), Max: 37.2 (20 Jan 2022 20:01)  T(F): 98.5 (21 Jan 2022 04:35), Max: 98.9 (20 Jan 2022 20:01)  HR: 98 (21 Jan 2022 04:35) (98 - 114)  BP: 144/79 (21 Jan 2022 04:35) (99/62 - 144/79)  BP(mean): 101 (21 Jan 2022 04:35) (88 - 101)  RR: 16 (21 Jan 2022 04:35) (16 - 19)  SpO2: 95% (21 Jan 2022 04:35) (94% - 95%)    General: Pt Alert and oriented, NAD  KI/ACE/Aquacel DSG C/D/I  Pulses: Toes WWP  Sensation: SILT Grossly SPN/DPN/Saph/Derrick/Tib  Motor: 5/5 TA/GS/EHL, firing quad/psoas    A/P: 81yFemale s/p R Patella ORIF by Dr. MICHAEL Waite on 01-19  - Stable  - Pain Control  - DVT ppx: LVX 60 BID to Coumadin Bridge  - WBS: WBAT in [facility] vs. Home PT pending progress, pt prefers HPT if possible    Ortho Pager [phone]
Ortho Progress Note    Procedure: R Patella ORIF  Surgeon: Mariann    Pt comfortable without complaints, pain controlled; happy to have gotten her surgery overnight  Denies CP, SOB, N/V, numbness/tingling     Vital Signs Last 24 Hrs  T(C): 37 (20 Jan 2022 02:28), Max: 37 (20 Jan 2022 02:28)  T(F): 98.6 (20 Jan 2022 02:28), Max: 98.6 (20 Jan 2022 02:28)  HR: 104 (20 Jan 2022 02:28) (81 - 104)  BP: 123/77 (20 Jan 2022 02:28) (111/56 - 155/81)  BP(mean): 88 (20 Jan 2022 02:00) (78 - 104)  RR: 16 (20 Jan 2022 02:28) (15 - 25)  SpO2: 94% (20 Jan 2022 02:28) (92% - 97%)    General: Pt Alert and oriented, NAD  KI/ACE/Aquacel DSG C/D/I  Pulses: Toes WWP  Sensation: SILT Grossly SPN/DPN/Saph/Derrick/Tib  Motor: 5/5 TA/GS/EHL, firing quad/psoas    A/P: 81yFemale s/p R Patella ORIF by Dr. MICHAEL Waite on 01-19  - Stable  - Pain Control  - DVT ppx: LVX 60 BID to Coumadin Bridge  - Post op abx: Ancef  - WBS: WBAT in [place]I  - PT eval     Ortho Pager [phone]
Ortho Note    Pt comfortable without complaints, pain controlled  Denies CP, SOB, N/V, numbness/tingling     Vital Signs Last 24 Hrs  T(C): 36.8 (01-19-22 @ 08:44), Max: 37.1 (01-19-22 @ 05:25)  T(F): 98.2 (01-19-22 @ 08:44), Max: 98.8 (01-19-22 @ 05:25)  HR: 81 (01-19-22 @ 08:44) (81 - 99)  BP: 126/77 (01-19-22 @ 08:44) (126/77 - 135/81)  BP(mean): 99 (01-19-22 @ 05:25) (99 - 99)  RR: 16 (01-19-22 @ 08:44) (16 - 18)  SpO2: 93% (01-19-22 @ 08:44) (93% - 93%)  AVSS    General: Pt Alert and oriented, NAD  RLE:  RLE anterior knee with superficial ~ 1-2 cm abrasion on anterior patella, pt remains unable to flex R knee past 10 degrees (Active and passive) 2/2 pain, R knee remains swollen and TTP     Pulses: 2+ DP   Sensation: dpn/spn/tibial/sural/saph b/l   Motor: EHL/FHL/TA/GS b/l, quads/hams of RLE deferred 2/2 pain     A/P: 81yFemale with R patella fx sustained on 1/17/22   - OR for ORIF R patella 1/19/22  - Stable  - Pain Control  - DVT ppx: scds  - PT, WBS: KI in extension of RLE, WBAT  - nausea control/bowel regimen  - c/w home meds except hold coumadin for OR, will d/w Dr Waite and medicine as to when it is appropriate to restart coumadin postop    - f/u INR, most recent INR was 2.03, f/u repeat INR at 1 pm   - d/w Dr Waite           Ortho Pager [phone]
Ortho Note    Pt seen and examined. Comfortable without complaints, pain controlled  Denies CP, SOB, N/V, numbness/tingling     Vital Signs Last 24 Hrs  T(C): 36.8 (01-20-22 @ 09:00), Max: 37.5 (01-20-22 @ 06:44)  T(F): 98.3 (01-20-22 @ 09:00), Max: 99.5 (01-20-22 @ 06:44)  HR: 111 (01-20-22 @ 09:00) (111 - 111)  BP: 123/71 (01-20-22 @ 09:00) (123/71 - 165/95)  BP(mean): 88 (01-20-22 @ 09:00) (88 - 118)  RR: 16 (01-20-22 @ 09:00) (16 - 17)  SpO2: 95% (01-20-22 @ 09:00) (93% - 95%)  AVSS    General: Pt Alert and oriented, NAD  DSG- ace C/D/I, KI in place  Pulses: +2DP, WWP  Sensation: SILT BLE  Motor: 5/5 EHL/FHL/TA/GS                          12.1   7.70  )-----------( 255      ( 19 Jan 2022 05:30 )             37.1     01-19    139  |  104  |  20  ----------------------------<  111<H>  4.1   |  24  |  0.56    Ca    8.9      19 Jan 2022 05:30        A/P: 81yFemale POD#0 (late case) s/p R patella ORIF  - VSS, f/u AM labs and PT/INR  - Pain Control  - DVT ppx: LVX 60mg bid bridge with home dose coumadin until INR >2.0  - PT, WBS: WBAT in [facility]  - OOB for meals, I/S  - continue bowel regimen  - appreciate medicine/ heme recs  - dispo: pending PT eval    Ortho Pager [phone]
Ortho Note    Pt seen and examined. Comfortable without complaints, pain controlled  Denies CP, SOB, N/V, numbness/tingling     Vital Signs Last 24 Hrs  T(C): 36.8 (01-21-22 @ 08:20), Max: 36.8 (01-21-22 @ 08:20)  T(F): 98.3 (01-21-22 @ 08:20), Max: 98.3 (01-21-22 @ 08:20)  HR: 96 (01-21-22 @ 08:20) (96 - 96)  BP: 138/73 (01-21-22 @ 08:20) (138/73 - 138/73)  BP(mean): --  RR: 16 (01-21-22 @ 08:20) (16 - 16)  SpO2: 90% (01-21-22 @ 08:20) (90% - 90%)  AVSS      General: Pt Alert and oriented, NAD  DSG- ace C/D/I, KI in place  Pulses: +2DP, WWP  Sensation: SILT BLE  Motor: 5/5 EHL/FHL/TA/GS                        11.5   9.36  )-----------( 240      ( 21 Jan 2022 06:54 )             35.0     01-21    140  |  104  |  18  ----------------------------<  123<H>  3.9   |  26  |  0.55    Ca    8.8      21 Jan 2022 06:54        A/P: 81yFemale POD#1-2 (late case) s/p R patella ORIF  - VSS, f/u AM labs and PT/INR  - Pain Control  - DVT ppx: LVX 60mg bid bridge with home dose coumadin until INR >2.0  - PT, WBS: WBAT in [facility]  - OOB for meals, I/S  - continue bowel regimen  - appreciate medicine/ heme recs  - dispo: home with HPT vs. [facility] pending progress with PT today    Ortho Pager [phone]
Ortho Post Op Check    Procedure: R Patella ORIF  Surgeon: Mariann    Pt comfortable without complaints, pain controlled  Denies CP, SOB, N/V, numbness/tingling     Vital Signs Last 24 Hrs  T(C): 36.8 (01-19-22 @ 20:15), Max: 36.8 (01-19-22 @ 20:15)  T(F): 98.2 (01-19-22 @ 20:15), Max: 98.2 (01-19-22 @ 20:15)  HR: 103 (01-19-22 @ 20:15) (103 - 103)  BP: 111/73 (01-19-22 @ 20:15) (111/73 - 111/73)  BP(mean): 86 (01-19-22 @ 20:15) (86 - 86)  RR: 18 (01-19-22 @ 20:15) (18 - 18)  SpO2: 92% (01-19-22 @ 20:15) (92% - 92%)    General: Pt Alert and oriented, NAD  KI/ACE/Aquacel DSG C/D/I  Pulses: Toes WWP  Sensation: SILT Grossly SPN/DPN/Saph/Derrick/Tib  Motor: 5/5 TA/GS/EHL, firing quad/psoas      A/P: 81yFemale s/p R Patella ORIF by Dr. MICHAEL Waite on 01-19  - Stable  - Pain Control  - DVT ppx: LVX 60 BID to Coumadin Bridge  - Post op abx: Ancef  - WBS: WBAT in [facility]  - PT eval     Ortho Pager [phone]
Ortho Progress Note    Procedure: R Patella ORIF  Surgeon: Mariann    Pt comfortable without complaints, pain controlled with tylenol. No issues yesterday.   Denies CP, SOB, N/V, numbness/tingling     Vital Signs Last 24 Hrs  T(C): 37.1 (22 Jan 2022 04:46), Max: 37.5 (21 Jan 2022 20:28)  T(F): 98.8 (22 Jan 2022 04:46), Max: 99.5 (21 Jan 2022 20:28)  HR: 102 (22 Jan 2022 04:46) (96 - 122)  BP: 131/80 (22 Jan 2022 04:46) (127/75 - 165/79)  BP(mean): 97 (22 Jan 2022 04:46) (92 - 97)  RR: 16 (22 Jan 2022 04:46) (16 - 16)  SpO2: 94% (22 Jan 2022 04:46) (93% - 96%)    General: Pt Alert and oriented, NAD  KI/ACE/Aquacel DSG C/D/I  Pulses: Toes WWP  Sensation: SILT Grossly SPN/DPN/Saph/Derrick/Tib  Motor: 5/5 TA/GS/EHL, firing quad/psoas    A/P: 81yFemale s/p R Patella ORIF by Dr. MICHAEL Waite on 01-19  - Stable  - Pain Control  - DVT ppx: LVX 60 BID to Coumadin Bridge  - WBS: [facility] in [facility] today    Ortho Pager [phone]
  Patient is a 81y old  Female who presents with a chief complaint of R patella fx (2022 10:40)      INTERVAL HPI/OVERNIGHT EVENTS:    Pt. seen and examined earlier today  Pt. c/o minimal RLE pain, controlled w/ Tylenol and oxycodone    Review of Systems: 12 point review of systems otherwise negative    MEDICATIONS  (STANDING):  acetaminophen     Tablet .. 975 milliGRAM(s) Oral every 8 hours  atorvastatin 20 milliGRAM(s) Oral at bedtime  ceFAZolin   IVPB 2000 milliGRAM(s) IV Intermittent every 8 hours  enoxaparin Injectable 60 milliGRAM(s) SubCutaneous two times a day  escitalopram 10 milliGRAM(s) Oral daily  polyethylene glycol 3350 17 Gram(s) Oral daily  senna 2 Tablet(s) Oral at bedtime    MEDICATIONS  (PRN):  bisacodyl Suppository 10 milliGRAM(s) Rectal daily PRN Constipation  HYDROmorphone  Injectable 0.5 milliGRAM(s) IV Push every 4 hours PRN Breakthrough  magnesium hydroxide Suspension 30 milliLiter(s) Oral daily PRN Constipation  oxyCODONE    IR 10 milliGRAM(s) Oral every 4 hours PRN Severe Pain (7 - 10)  oxyCODONE    IR 5 milliGRAM(s) Oral every 4 hours PRN Moderate Pain (4 - 6)      Allergies    Compazine (Anaphylaxis)    Intolerances          Vital Signs Last 24 Hrs  T(C): 36.8 (2022 09:00), Max: 37.5 (2022 06:44)  T(F): 98.3 (2022 09:00), Max: 99.5 (2022 06:44)  HR: 111 (2022 09:00) (91 - 111)  BP: 123/71 (2022 09:00) (111/56 - 165/95)  BP(mean): 88 (2022 09:00) (78 - 118)  RR: 16 (2022 09:00) (15 - 25)  SpO2: 95% (2022 09:00) (92% - 97%)  CAPILLARY BLOOD GLUCOSE          -19 @ 07:01  -  -20 @ 07:00  --------------------------------------------------------  IN: 570 mL / OUT: 0 mL / NET: 570 mL     @ 07:01  -   @ 12:58  --------------------------------------------------------  IN: 420 mL / OUT: 0 mL / NET: 420 mL        Physical Exam:  (earlier today)  Daily Height in cm: 151.1 (2022 16:10)    Daily   General:  comfortable-appearing in NAD  HEENT:  MMM, no pallor  CV:  RRR  Lungs:  CTA B/L aneteriorly  Abdomen:  soft NT ND  Extremities:  RLE immobilizer, dressing C/D/I   Skin:  WWP  Neuro:  AAOx3    LABS:                        11.4   10.72 )-----------( 251      ( 2022 10:45 )             35.3         135  |  102  |  21  ----------------------------<  141<H>  4.3   |  23  |  0.64    Ca    8.9      2022 10:45      PT/INR - ( 2022 10:45 )   PT: 22.0 sec;   INR: 1.89          PTT - ( 2022 05:30 )  PTT:45.7 sec  Urinalysis Basic - ( 2022 01:18 )    Color: Yellow / Appearance: Clear / S.025 / pH: x  Gluc: x / Ketone: Trace mg/dL  / Bili: Negative / Urobili: 0.2 E.U./dL   Blood: x / Protein: NEGATIVE mg/dL / Nitrite: NEGATIVE   Leuk Esterase: NEGATIVE / RBC: < 5 /HPF / WBC < 5 /HPF   Sq Epi: x / Non Sq Epi: 0-5 /HPF / Bacteria: Present /HPF    
MEG MOLINA  81y  Female    Patient is a 81y old  Female who presents with a chief complaint of R patella fx (2022 10:42)      HPI:  81 F PMH breast ca (in remission), unknown clotting disorder (on coumadin, last dose ) HLD who was transferred from [facility] w/ [facility]. Pt endorsed immediate pain and swelling of R knee after injury, and states that she was unable to bear weight on RLE after injury. She was subsequently brought to [facility]. Denies nu,bness/tingling. Endorses limited ROM of R knee 2/2 pain but denies new motor deficits. Denies head strike. Denies LOC. States that her fingers feel sore but states that she has full ROM of b/l fingers and wrist.  (2022 02:38)      PAST MEDICAL & SURGICAL HISTORY:  Mild HTN    HLD (hyperlipidemia)    Clotting disorder    History of lumpectomy of right breast    H/O  section    History of bunionectomy of both great toes    Hammer toe of right foot    H/O repair of right rotator cuff        Home Medications:  acetaminophen 325 mg oral tablet: 3 tab(s) orally every 8 hours (2022 10:46)  bisacodyl 10 mg rectal suppository: 1 suppository(ies) rectal once a day, As needed, Constipation (2022 13:08)  Coumadin 4 mg oral tablet: 1 tab(s) orally once a day on Mon, Tue, Wed, Fri, Sat (2022 10:13)  Coumadin 5 mg oral tablet: 1 tab(s) orally once a day on  (2022 10:13)  Crestor 20 mg oral tablet: 1 tab(s) orally once a day (2022 02:41)  enoxaparin 60 mg/0.6 mL injectable solution:  injectable every 12 hours.    INR SHOULD BE CHECKED DAILY. DISCONTINUE LOVENOX WHEN INR &gt;2.0 (2022 13:08)  escitalopram 10 mg oral tablet: 1 tab(s) orally once a day (2022 02:41)  oxyCODONE 10 mg oral tablet: 1 tab(s) orally every 4 hours, As needed, Severe Pain (7 - 10) (2022 13:08)  oxyCODONE 5 mg oral tablet: 1 tab(s) orally every 4 hours, As needed, Moderate Pain (4 - 6) (2022 13:08)  polyethylene glycol 3350 oral powder for reconstitution: 17 gram(s) orally once a day (2022 13:08)  senna oral tablet: 2 tab(s) orally once a day (at bedtime) (2022 13:08)      81y    FAMILY HISTORY:      Marital Status:  (   )    (   ) Single    (   )    (  )   Lives with: (  ) alone  (  ) children   (  ) spouse   (  ) parents  (  ) other  Recent Travel: No recent travel  Occupation:    Substance Use (street drugs): ( x ) never used  (  ) other:  Tobacco Usage:  ( x  ) never smoked   (   ) former smoker   (   ) current smoker  (     ) pack year  Alcohol Usage: None       MEDICATIONS  (STANDING):  acetaminophen     Tablet .. 975 milliGRAM(s) Oral every 8 hours  atorvastatin 20 milliGRAM(s) Oral at bedtime  enoxaparin Injectable 60 milliGRAM(s) SubCutaneous two times a day  escitalopram 10 milliGRAM(s) Oral daily  lactated ringers. 1000 milliLiter(s) (80 mL/Hr) IV Continuous <Continuous>  polyethylene glycol 3350 17 Gram(s) Oral daily  senna 2 Tablet(s) Oral at bedtime  warfarin 4 milliGRAM(s) Oral at bedtime    MEDICATIONS  (PRN):  bisacodyl Suppository 10 milliGRAM(s) Rectal daily PRN Constipation  HYDROmorphone  Injectable 0.5 milliGRAM(s) IV Push every 4 hours PRN Breakthrough  magnesium hydroxide Suspension 30 milliLiter(s) Oral daily PRN Constipation  oxyCODONE    IR 10 milliGRAM(s) Oral every 4 hours PRN Severe Pain (7 - 10)  oxyCODONE    IR 5 milliGRAM(s) Oral every 4 hours PRN Moderate Pain (4 - 6)    REVIEW OF SYSTEMS:  CONSTITUTIONAL: No fever, weight loss, or fatigue  EYES: No eye pain, visual disturbances, or discharge  ENMT:  No difficulty hearing, tinnitus, vertigo; No sinus or throat pain  NECK: No pain or stiffness  BREASTS: No pain, masses, or nipple discharge  RESPIRATORY: No cough, wheezing, chills or hemoptysis; No shortness of breath  CARDIOVASCULAR: No chest pain, palpitations, dizziness, or leg swelling  GASTROINTESTINAL: No abdominal or epigastric pain. No nausea, vomiting, or hematemesis; No diarrhea or constipation. No melena or hematochezia.  GENITOURINARY: No dysuria, frequency, hematuria, or incontinence  NEUROLOGICAL: No headaches, memory loss, loss of strength, numbness, or tremors  SKIN: No itching, burning, rashes, or lesions   LYMPH NODES: No enlarged glands  ENDOCRINE: No heat or cold intolerance; No hair loss  MUSCULOSKELETAL: Rt knee pain  PSYCHIATRIC: No depression, anxiety, mood swings, or difficulty sleeping    Vital Signs Last 24 Hrs  T(C): 37.2 (2022 15:57), Max: 37.2 (2022 20:01)  T(F): 99 (2022 15:57), Max: 99 (2022 15:57)  HR: 104 (2022 15:57) (96 - 122)  BP: 128/73 (2022 15:57) (126/73 - 165/79)  BP(mean): 101 (2022 04:35) (91 - 101)  RR: 16 (2022 15:57) (16 - 16)  SpO2: 95% (2022 15:57) (90% - 96%)    PHYSICAL EXAM:  GENERAL: NAD, well-groomed, well-developed  HEAD:  Atraumatic, Normocephalic  EYES: EOMI, PERRLA, conjunctiva and sclera clear  ENMT: No tonsillar erythema, exudates, or enlargement; Moist mucous membranes, Good dentition, No lesions  NECK: Supple, No JVD, Normal thyroid  NERVOUS SYSTEM:  Alert & Oriented X3, Good concentration; Motor Strength 5/5 B/L upper and lower extremities; DTRs 2+ intact and symmetric  CHEST/LUNG: Clear to percussion bilaterally; No rales, rhonchi, wheezing, or rubs  HEART: Regular rate and rhythm; No murmurs, rubs, or gallops  ABDOMEN: Soft, Nontender, Nondistended; Bowel sounds present  EXTREMITIES:  2+ Peripheral Pulses, No clubbing, cyanosis, or edema  LYMPH: No lymphadenopathy noted  SKIN: No rashes or lesions    Consultant(s) Notes Reviewed:  [x ] YES  [ ] NO  Care Discussed with Consultants/Other Providers [ x] YES  [ ] NO    LABS:                        11.5   9.36  )-----------( 240      ( 2022 06:54 )             35.0         140  |  104  |  18  ----------------------------<  123<H>  3.9   |  26  |  0.55    Ca    8.8      2022 06:54      PT/INR - ( 2022 06:54 )   PT: 19.0 sec;   INR: 1.62              CAPILLARY BLOOD GLUCOSE            RADIOLOGY & ADDITIONAL TESTS:  Echo:        LVSF:        EF:        RVSF:        LA:        RA:        Mitral Valve:        Aortic Valve:       Tricuspid Valve:        Pulmonic Valve:        Pericardium:   Imaging Personally Reviewed:  [ ] YES  [ ] NO

## 2022-01-22 NOTE — PROGRESS NOTE ADULT - REASON FOR ADMISSION
[facility] morataya fx

## 2022-01-22 NOTE — PROGRESS NOTE ADULT - PROVIDER SPECIALTY LIST ADULT
Hospitalist
Orthopedics
Internal Medicine

## 2022-01-22 NOTE — DISCHARGE NOTE NURSING/CASE MANAGEMENT/SOCIAL WORK - NSDCPEFALRISK_GEN_ALL_CORE
For information on Fall & Injury Prevention, visit: [email or web address] OR  [email or web address] OR  https://www.cdc.gov/steadi/patient.html

## 2022-01-22 NOTE — DISCHARGE NOTE NURSING/CASE MANAGEMENT/SOCIAL WORK - PATIENT PORTAL LINK FT
You can access the FollowMyHealth Patient Portal offered by [facility] by registering at the following website: [email or web address]. By joining [employer]’s FollowMyHealth portal, you will also be able to view your health information using other applications (apps) compatible with our system.

## 2022-01-26 DIAGNOSIS — E78.5 HYPERLIPIDEMIA, UNSPECIFIED: ICD-10-CM

## 2022-01-26 DIAGNOSIS — Z88.8 ALLERGY STATUS TO OTHER DRUGS, MEDICAMENTS AND BIOLOGICAL SUBSTANCES: ICD-10-CM

## 2022-01-26 DIAGNOSIS — F32.A DEPRESSION, UNSPECIFIED: ICD-10-CM

## 2022-01-26 DIAGNOSIS — Z92.3 PERSONAL HISTORY OF IRRADIATION: ICD-10-CM
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
## 2022-01-26 DIAGNOSIS — S83.411A SPRAIN OF MEDIAL COLLATERAL LIGAMENT OF RIGHT KNEE, INITIAL ENCOUNTER: ICD-10-CM

## 2022-01-26 DIAGNOSIS — Z79.01 LONG TERM (CURRENT) USE OF ANTICOAGULANTS: ICD-10-CM

## 2022-01-26 DIAGNOSIS — Z85.3 PERSONAL HISTORY OF MALIGNANT NEOPLASM OF BREAST: ICD-10-CM
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
## 2022-01-26 DIAGNOSIS — E11.9 TYPE 2 DIABETES MELLITUS WITHOUT COMPLICATIONS: ICD-10-CM

## 2022-01-26 DIAGNOSIS — D68.59 OTHER PRIMARY THROMBOPHILIA: ICD-10-CM

## 2022-01-26 DIAGNOSIS — I10 ESSENTIAL (PRIMARY) HYPERTENSION: ICD-10-CM

## 2022-01-26 DIAGNOSIS — K21.9 GASTRO-ESOPHAGEAL REFLUX DISEASE WITHOUT ESOPHAGITIS: ICD-10-CM

## 2022-01-26 DIAGNOSIS — D68.52 PROTHROMBIN GENE MUTATION: ICD-10-CM

## 2022-01-26 DIAGNOSIS — S82.041A DISPLACED COMMINUTED FRACTURE OF RIGHT PATELLA, INITIAL ENCOUNTER FOR CLOSED FRACTURE: ICD-10-CM
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
## 2022-02-17 PROCEDURE — 71275 CT ANGIOGRAPHY CHEST: CPT

## 2022-02-17 PROCEDURE — 97161 PT EVAL LOW COMPLEX 20 MIN: CPT

## 2022-02-17 PROCEDURE — 85610 PROTHROMBIN TIME: CPT

## 2022-02-17 PROCEDURE — 87635 SARS-COV-2 COVID-19 AMP PRB: CPT

## 2022-02-17 PROCEDURE — 80048 BASIC METABOLIC PNL TOTAL CA: CPT

## 2022-02-17 PROCEDURE — 86901 BLOOD TYPING SEROLOGIC RH(D): CPT

## 2022-02-17 PROCEDURE — 97116 GAIT TRAINING THERAPY: CPT

## 2022-02-17 PROCEDURE — C9399: CPT

## 2022-02-17 PROCEDURE — 81001 URINALYSIS AUTO W/SCOPE: CPT

## 2022-02-17 PROCEDURE — 86850 RBC ANTIBODY SCREEN: CPT

## 2022-02-17 PROCEDURE — 93005 ELECTROCARDIOGRAM TRACING: CPT

## 2022-02-17 PROCEDURE — 86923 COMPATIBILITY TEST ELECTRIC: CPT

## 2022-02-17 PROCEDURE — 85027 COMPLETE CBC AUTOMATED: CPT

## 2022-02-17 PROCEDURE — 70450 CT HEAD/BRAIN W/O DYE: CPT

## 2022-02-17 PROCEDURE — 86900 BLOOD TYPING SEROLOGIC ABO: CPT

## 2022-02-17 PROCEDURE — 73130 X-RAY EXAM OF HAND: CPT

## 2022-02-17 PROCEDURE — 85730 THROMBOPLASTIN TIME PARTIAL: CPT

## 2022-02-17 PROCEDURE — 36415 COLL VENOUS BLD VENIPUNCTURE: CPT

## 2022-02-17 PROCEDURE — 99285 EMERGENCY DEPT VISIT HI MDM: CPT

## 2022-02-17 PROCEDURE — 71045 X-RAY EXAM CHEST 1 VIEW: CPT

## 2022-02-17 PROCEDURE — 73564 X-RAY EXAM KNEE 4 OR MORE: CPT

## 2022-06-22 NOTE — PHYSICAL THERAPY INITIAL EVALUATION ADULT - PERTINENT HX OF CURRENT PROBLEM, REHAB EVAL
GRACIA ASSESSMENT:    Pt does have a previous diagnosis of GRACIA. Pt does routinely use a CPAP device at home. CPAP INITIATION:    Pt to be placed on CPAP: yes  Pt refused: no    Patient brought his own mask and tubing. 81 F PMH breast ca (in remission), unknown clotting disorder (on coumadin, last dose 1/17) HLD who was transferred from [facility] w/ R patella fx after sustaining mech fall on R knee and R hand this afternoon. Pt endorsed immediate pain and swelling of R knee after injury, and states that she was unable to bear weight on RLE after injury.

## 2022-09-14 NOTE — PROGRESS NOTE ADULT - PROBLEM SELECTOR PLAN 1
s/p R patella ORIF; cont. post-op mgmt per Ortho
ekg shows sinus tachy  consider CT chest to r/o PE  cont tele monitoring
Skyrizi Counseling: I discussed with the patient the risks of risankizumab-rzaa including but not limited to immunosuppression, and serious infections.  The patient understands that monitoring is required including a PPD at baseline and must alert us or the primary physician if symptoms of infection or other concerning signs are noted.

## 2023-04-14 PROBLEM — Z00.00 ENCOUNTER FOR PREVENTIVE HEALTH EXAMINATION: Status: ACTIVE | Noted: 2023-04-14

## 2024-10-28 ENCOUNTER — APPOINTMENT (OUTPATIENT)
Dept: RADIOLOGY | Facility: CLINIC | Age: 84
End: 2024-10-28
Payer: MEDICARE

## 2024-10-28 ENCOUNTER — OUTPATIENT (OUTPATIENT)
Dept: OUTPATIENT SERVICES | Facility: HOSPITAL | Age: 84
LOS: 1 days | End: 2024-10-28

## 2024-10-28 DIAGNOSIS — Z98.890 OTHER SPECIFIED POSTPROCEDURAL STATES: Chronic | ICD-10-CM

## 2024-10-28 DIAGNOSIS — Z98.891 HISTORY OF UTERINE SCAR FROM PREVIOUS SURGERY: Chronic | ICD-10-CM

## 2024-10-28 DIAGNOSIS — M20.41 OTHER HAMMER TOE(S) (ACQUIRED), RIGHT FOOT: Chronic | ICD-10-CM

## 2024-10-28 PROBLEM — E78.5 HYPERLIPIDEMIA, UNSPECIFIED: Chronic | Status: ACTIVE | Noted: 2022-01-18

## 2024-10-28 PROBLEM — I10 ESSENTIAL (PRIMARY) HYPERTENSION: Chronic | Status: ACTIVE | Noted: 2022-01-18

## 2024-10-28 PROBLEM — D68.9 COAGULATION DEFECT, UNSPECIFIED: Chronic | Status: ACTIVE | Noted: 2022-01-18

## 2024-10-28 PROCEDURE — 73030 X-RAY EXAM OF SHOULDER: CPT | Mod: 26,LT

## 2025-06-06 NOTE — PRE-OP CHECKLIST - HEIGHT IN CM
[Time Spent: ___ minutes] : I have spent [unfilled] minutes of time on the encounter which excludes teaching and separately reported services.
151.1

## (undated) DEVICE — SUT VICRYL 0 18" TIES

## (undated) DEVICE — ZIMMER CEMENT MIXING SYSTEM COMPACT VACUUM

## (undated) DEVICE — PACK ORTHO FOOT ANKLE

## (undated) DEVICE — Device

## (undated) DEVICE — DRAPE LIGHT HANDLE COVER (BLUE)

## (undated) DEVICE — DRSG COBAN 6"

## (undated) DEVICE — DRAPE TOWEL BLUE 17" X 24"

## (undated) DEVICE — VENODYNE/SCD SLEEVE CALF MEDIUM

## (undated) DEVICE — DRSG STOCKINETTE IMPERVIOUS XL

## (undated) DEVICE — WARMING BLANKET UPPER ADULT

## (undated) DEVICE — SYR ASEPTO

## (undated) DEVICE — DRAPE U 47X51" LF STERILE

## (undated) DEVICE — SUT HEWSON RETRIEVER

## (undated) DEVICE — MARKING PEN W RULER